# Patient Record
Sex: MALE | Race: WHITE | NOT HISPANIC OR LATINO
[De-identification: names, ages, dates, MRNs, and addresses within clinical notes are randomized per-mention and may not be internally consistent; named-entity substitution may affect disease eponyms.]

---

## 2019-04-24 PROBLEM — Z00.00 ENCOUNTER FOR PREVENTIVE HEALTH EXAMINATION: Status: ACTIVE | Noted: 2019-04-24

## 2019-05-07 ENCOUNTER — APPOINTMENT (OUTPATIENT)
Dept: UROLOGY | Facility: CLINIC | Age: 64
End: 2019-05-07
Payer: COMMERCIAL

## 2019-05-07 VITALS
DIASTOLIC BLOOD PRESSURE: 80 MMHG | BODY MASS INDEX: 22.83 KG/M2 | HEART RATE: 73 BPM | TEMPERATURE: 98.4 F | OXYGEN SATURATION: 99 % | WEIGHT: 176 LBS | HEIGHT: 73.5 IN | SYSTOLIC BLOOD PRESSURE: 144 MMHG

## 2019-05-07 PROCEDURE — 99204 OFFICE O/P NEW MOD 45 MIN: CPT | Mod: 25

## 2019-05-07 PROCEDURE — 51798 US URINE CAPACITY MEASURE: CPT

## 2019-05-07 RX ORDER — SILDENAFIL 20 MG/1
20 TABLET ORAL
Qty: 90 | Refills: 11 | Status: ACTIVE | COMMUNITY
Start: 2019-05-07 | End: 1900-01-01

## 2019-05-07 NOTE — LETTER BODY
[Dear  ___] : Dear  [unfilled], [FreeTextEntry2] : Esdras Christianson MD\par [facility]\par [address]\[place] [address] [FreeTextEntry1] : I had the pleasure of seeing your patient Amando Reyes in the office in consultation today. Please see the attached note for full details.\par \par Thank you very much for allowing me to participate in the care of this patient. If you have any questions please feel free to call me at any time. \par \par \par Sincerely yours,\par \par \par \par Chaka Cosme MD, ADRIÁN\par Director, Male Fertility and Microsurgery\par  of Urology\par [facility]\par \par \par This letter has been dictated using computer software but not reviewed to expedite transmission.\par

## 2019-05-07 NOTE — HISTORY OF PRESENT ILLNESS
[FreeTextEntry1] : 64M generally healthy comes in for prostate evalatuion. using viagra for many years. using viagra 50mg bought [place]. responds well to medcation. underwent vasectomy 25 years ago. reports penile and testicular sensitivity over time. previously was having pain during sexual activity. pain has now resolved. IPSS 6. nocturia x 3. good FOS. occasional frequency. no hematuria. no dysuria. no family history prostate kidney bladder cancer. occasional double voiding. +urgency.

## 2019-05-07 NOTE — PHYSICAL EXAM
[General Appearance - Well Developed] : well developed [General Appearance - Well Nourished] : well nourished [Normal Appearance] : normal appearance [Well Groomed] : well groomed [Heart Rate And Rhythm] : Heart rate and rhythm were normal [] : no respiratory distress [Abdomen Soft] : soft [Abdomen Tenderness] : non-tender [Abdomen Hernia] : no hernia was discovered [Costovertebral Angle Tenderness] : no ~M costovertebral angle tenderness [Urethral Meatus] : meatus normal [Penis Abnormality] : normal circumcised penis [Scrotum] : the scrotum was normal [Urinary Bladder Findings] : the bladder was normal on palpation [Epididymis] : the epididymides were normal [Testes Tenderness] : no tenderness of the testes [Testes Mass (___cm)] : there were no testicular masses [No Prostate Nodules] : no prostate nodules [Prostate Size ___ gm] : prostate size [unfilled] gm [Normal Station and Gait] : the gait and station were normal for the patient's age [Skin Color & Pigmentation] : normal skin color and pigmentation [Oriented To Time, Place, And Person] : oriented to person, place, and time [No Focal Deficits] : no focal deficits [No Palpable Adenopathy] : no palpable adenopathy

## 2019-05-08 LAB
APPEARANCE: CLEAR
BACTERIA: NEGATIVE
BILIRUBIN URINE: NEGATIVE
BLOOD URINE: NEGATIVE
COLOR: YELLOW
GLUCOSE QUALITATIVE U: NEGATIVE
HYALINE CASTS: 0 /LPF
KETONES URINE: NEGATIVE
LEUKOCYTE ESTERASE URINE: NEGATIVE
MICROSCOPIC-UA: NORMAL
NITRITE URINE: NEGATIVE
PH URINE: 5.5
PROTEIN URINE: NEGATIVE
PSA SERPL-MCNC: 0.88 NG/ML
RED BLOOD CELLS URINE: 1 /HPF
SPECIFIC GRAVITY URINE: 1.02
SQUAMOUS EPITHELIAL CELLS: 0 /HPF
UROBILINOGEN URINE: NORMAL
WHITE BLOOD CELLS URINE: 0 /HPF

## 2019-05-09 LAB — BACTERIA UR CULT: NORMAL

## 2019-05-10 ENCOUNTER — TRANSCRIPTION ENCOUNTER (OUTPATIENT)
Age: 64
End: 2019-05-10

## 2019-06-25 ENCOUNTER — APPOINTMENT (OUTPATIENT)
Dept: UROLOGY | Facility: CLINIC | Age: 64
End: 2019-06-25

## 2019-07-16 ENCOUNTER — APPOINTMENT (OUTPATIENT)
Dept: UROLOGY | Facility: CLINIC | Age: 64
End: 2019-07-16
Payer: COMMERCIAL

## 2019-07-16 VITALS — HEART RATE: 97 BPM | SYSTOLIC BLOOD PRESSURE: 127 MMHG | DIASTOLIC BLOOD PRESSURE: 89 MMHG

## 2019-07-16 DIAGNOSIS — R35.1 BENIGN PROSTATIC HYPERPLASIA WITH LOWER URINARY TRACT SYMPMS: ICD-10-CM

## 2019-07-16 DIAGNOSIS — N40.1 BENIGN PROSTATIC HYPERPLASIA WITH LOWER URINARY TRACT SYMPMS: ICD-10-CM

## 2019-07-16 PROCEDURE — 99213 OFFICE O/P EST LOW 20 MIN: CPT

## 2019-07-16 NOTE — HISTORY OF PRESENT ILLNESS
[FreeTextEntry1] : IPSS 16\par no clear improvement\par pirmary symptoms are increased urgency and frequency usually much worse Mondays\par PSA 0.88\par

## 2019-09-24 ENCOUNTER — APPOINTMENT (OUTPATIENT)
Dept: HEART AND VASCULAR | Facility: CLINIC | Age: 64
End: 2019-09-24
Payer: COMMERCIAL

## 2019-09-24 VITALS
DIASTOLIC BLOOD PRESSURE: 80 MMHG | BODY MASS INDEX: 23.35 KG/M2 | HEIGHT: 73.5 IN | SYSTOLIC BLOOD PRESSURE: 140 MMHG | WEIGHT: 179.99 LBS | OXYGEN SATURATION: 97 % | TEMPERATURE: 98.1 F | HEART RATE: 81 BPM

## 2019-09-24 DIAGNOSIS — Z78.9 OTHER SPECIFIED HEALTH STATUS: ICD-10-CM

## 2019-09-24 DIAGNOSIS — E78.00 PURE HYPERCHOLESTEROLEMIA, UNSPECIFIED: ICD-10-CM

## 2019-09-24 DIAGNOSIS — Z82.3 FAMILY HISTORY OF STROKE: ICD-10-CM

## 2019-09-24 DIAGNOSIS — L64.9 ANDROGENIC ALOPECIA, UNSPECIFIED: ICD-10-CM

## 2019-09-24 DIAGNOSIS — Z83.49 FAMILY HISTORY OF OTHER ENDOCRINE, NUTRITIONAL AND METABOLIC DISEASES: ICD-10-CM

## 2019-09-24 DIAGNOSIS — Z82.49 FAMILY HISTORY OF ISCHEMIC HEART DISEASE AND OTHER DISEASES OF THE CIRCULATORY SYSTEM: ICD-10-CM

## 2019-09-24 DIAGNOSIS — R03.0 ELEVATED BLOOD-PRESSURE READING, W/OUT DIAGNOSIS OF HYPERTENSION: ICD-10-CM

## 2019-09-24 DIAGNOSIS — Z87.891 PERSONAL HISTORY OF NICOTINE DEPENDENCE: ICD-10-CM

## 2019-09-24 PROCEDURE — 99203 OFFICE O/P NEW LOW 30 MIN: CPT

## 2019-09-24 PROCEDURE — 36415 COLL VENOUS BLD VENIPUNCTURE: CPT

## 2019-09-24 PROCEDURE — 93000 ELECTROCARDIOGRAM COMPLETE: CPT

## 2019-09-24 RX ORDER — TAMSULOSIN HYDROCHLORIDE 0.4 MG/1
0.4 CAPSULE ORAL
Qty: 30 | Refills: 11 | Status: DISCONTINUED | COMMUNITY
Start: 2019-05-07 | End: 2019-09-24

## 2019-09-24 RX ORDER — FINASTERIDE 1 MG/1
1 TABLET ORAL
Qty: 90 | Refills: 3 | Status: ACTIVE | COMMUNITY
Start: 2019-09-24 | End: 1900-01-01

## 2019-09-24 RX ORDER — OXYBUTYNIN CHLORIDE 10 MG/1
10 TABLET, EXTENDED RELEASE ORAL
Qty: 30 | Refills: 11 | Status: DISCONTINUED | COMMUNITY
Start: 2019-07-16 | End: 2019-09-24

## 2019-09-24 NOTE — PHYSICAL EXAM
[General Appearance - Well Developed] : well developed [Normal Appearance] : normal appearance [Well Groomed] : well groomed [General Appearance - Well Nourished] : well nourished [No Deformities] : no deformities [General Appearance - In No Acute Distress] : no acute distress [Normal Conjunctiva] : the conjunctiva exhibited no abnormalities [Normal Oral Mucosa] : normal oral mucosa [Eyelids - No Xanthelasma] : the eyelids demonstrated no xanthelasmas [No Oral Cyanosis] : no oral cyanosis [No Oral Pallor] : no oral pallor [Heart Sounds] : normal S1 and S2 [Heart Rate And Rhythm] : heart rate and rhythm were normal [Murmurs] : no murmurs present [Exaggerated Use Of Accessory Muscles For Inspiration] : no accessory muscle use [Respiration, Rhythm And Depth] : normal respiratory rhythm and effort [Auscultation Breath Sounds / Voice Sounds] : lungs were clear to auscultation bilaterally [Abdomen Soft] : soft [Abdomen Tenderness] : non-tender [Abnormal Walk] : normal gait [Abdomen Mass (___ Cm)] : no abdominal mass palpated [Gait - Sufficient For Exercise Testing] : the gait was sufficient for exercise testing [Nail Clubbing] : no clubbing of the fingernails [Petechial Hemorrhages (___cm)] : no petechial hemorrhages [Cyanosis, Localized] : no localized cyanosis [Skin Color & Pigmentation] : normal skin color and pigmentation [] : no rash [No Venous Stasis] : no venous stasis [No Skin Ulcers] : no skin ulcer [Skin Lesions] : no skin lesions [No Xanthoma] : no  xanthoma was observed [FreeTextEntry1] : No JVD or bruits

## 2019-09-24 NOTE — REASON FOR VISIT
[FreeTextEntry1] : 63 y/o M, former smoker.  No DM, + HLD PREVIOUSLY ON A STATIN.  Mild HTN.  REMOTE TOBACCO.\par \par EKG: NSR, normal axis and intervals, no ST-Tw abnormalities. 9/24/19\par \par \par Takes RRYE, CoEnQ10, MVI, B complex, Lysine, Fish Oils, Vit D, Probiotic, Peppermint, Colostrum, , Advil

## 2019-09-24 NOTE — HISTORY OF PRESENT ILLNESS
[FreeTextEntry1] : [facility]- Dr Cosme\par PCP-Dr Esdras An \par Jonatan- Sammie Farmer  [address]\par Cardio- formerly Ronny Valdez

## 2019-09-24 NOTE — ASSESSMENT
[FreeTextEntry1] : Mild HTN- MEDS DEFERRED\par \par HLD- Await labs\par \par Mult CRFs- Coronary calcium score ordered.

## 2019-10-10 ENCOUNTER — FORM ENCOUNTER (OUTPATIENT)
Age: 64
End: 2019-10-10

## 2019-10-11 ENCOUNTER — APPOINTMENT (OUTPATIENT)
Dept: CT IMAGING | Facility: HOSPITAL | Age: 64
End: 2019-10-11
Payer: COMMERCIAL

## 2019-10-11 ENCOUNTER — OUTPATIENT (OUTPATIENT)
Dept: OUTPATIENT SERVICES | Facility: HOSPITAL | Age: 64
LOS: 1 days | End: 2019-10-11
Payer: COMMERCIAL

## 2019-10-11 PROCEDURE — 75571 CT HRT W/O DYE W/CA TEST: CPT | Mod: 26

## 2019-10-11 PROCEDURE — 75571 CT HRT W/O DYE W/CA TEST: CPT

## 2019-10-16 RX ORDER — PITAVASTATIN CALCIUM 2.09 MG/1
2 TABLET, FILM COATED ORAL
Qty: 90 | Refills: 0 | Status: ACTIVE | COMMUNITY
Start: 2019-10-16

## 2022-12-27 ENCOUNTER — INPATIENT (INPATIENT)
Facility: HOSPITAL | Age: 67
LOS: 0 days | Discharge: HOME | DRG: 247 | End: 2022-12-28
Attending: HOSPITALIST | Admitting: HOSPITALIST
Payer: COMMERCIAL

## 2022-12-27 ENCOUNTER — TRANSCRIPTION ENCOUNTER (OUTPATIENT)
Age: 67
End: 2022-12-27

## 2022-12-27 VITALS
WEIGHT: 184.09 LBS | SYSTOLIC BLOOD PRESSURE: 117 MMHG | TEMPERATURE: 98 F | HEIGHT: 73 IN | RESPIRATION RATE: 18 BRPM | HEART RATE: 108 BPM | DIASTOLIC BLOOD PRESSURE: 76 MMHG

## 2022-12-27 DIAGNOSIS — I20.0 UNSTABLE ANGINA: ICD-10-CM

## 2022-12-27 DIAGNOSIS — I10 ESSENTIAL (PRIMARY) HYPERTENSION: ICD-10-CM

## 2022-12-27 DIAGNOSIS — E78.5 HYPERLIPIDEMIA, UNSPECIFIED: ICD-10-CM

## 2022-12-27 LAB
ALBUMIN SERPL ELPH-MCNC: 4.6 G/DL — SIGNIFICANT CHANGE UP (ref 3.3–5)
ALP SERPL-CCNC: 75 U/L — SIGNIFICANT CHANGE UP (ref 40–120)
ALT FLD-CCNC: 16 U/L — SIGNIFICANT CHANGE UP (ref 10–45)
ANION GAP SERPL CALC-SCNC: 11 MMOL/L — SIGNIFICANT CHANGE UP (ref 5–17)
AST SERPL-CCNC: 15 U/L — SIGNIFICANT CHANGE UP (ref 10–40)
BASOPHILS # BLD AUTO: 0.03 K/UL — SIGNIFICANT CHANGE UP (ref 0–0.2)
BASOPHILS NFR BLD AUTO: 0.5 % — SIGNIFICANT CHANGE UP (ref 0–2)
BILIRUB SERPL-MCNC: 0.6 MG/DL — SIGNIFICANT CHANGE UP (ref 0.2–1.2)
BLD GP AB SCN SERPL QL: NEGATIVE — SIGNIFICANT CHANGE UP
BUN SERPL-MCNC: 19 MG/DL — SIGNIFICANT CHANGE UP (ref 7–23)
CALCIUM SERPL-MCNC: 10.1 MG/DL — SIGNIFICANT CHANGE UP (ref 8.4–10.5)
CHLORIDE SERPL-SCNC: 101 MMOL/L — SIGNIFICANT CHANGE UP (ref 96–108)
CO2 SERPL-SCNC: 23 MMOL/L — SIGNIFICANT CHANGE UP (ref 22–31)
CREAT SERPL-MCNC: 1.17 MG/DL — SIGNIFICANT CHANGE UP (ref 0.5–1.3)
EGFR: 69 ML/MIN/1.73M2 — SIGNIFICANT CHANGE UP
EOSINOPHIL # BLD AUTO: 0.27 K/UL — SIGNIFICANT CHANGE UP (ref 0–0.5)
EOSINOPHIL NFR BLD AUTO: 4.2 % — SIGNIFICANT CHANGE UP (ref 0–6)
GLUCOSE SERPL-MCNC: 103 MG/DL — HIGH (ref 70–99)
HCT VFR BLD CALC: 46.7 % — SIGNIFICANT CHANGE UP (ref 39–50)
HGB BLD-MCNC: 16.1 G/DL — SIGNIFICANT CHANGE UP (ref 13–17)
IMM GRANULOCYTES NFR BLD AUTO: 0.3 % — SIGNIFICANT CHANGE UP (ref 0–0.9)
LYMPHOCYTES # BLD AUTO: 1.51 K/UL — SIGNIFICANT CHANGE UP (ref 1–3.3)
LYMPHOCYTES # BLD AUTO: 23.5 % — SIGNIFICANT CHANGE UP (ref 13–44)
MCHC RBC-ENTMCNC: 29.1 PG — SIGNIFICANT CHANGE UP (ref 27–34)
MCHC RBC-ENTMCNC: 34.5 GM/DL — SIGNIFICANT CHANGE UP (ref 32–36)
MCV RBC AUTO: 84.3 FL — SIGNIFICANT CHANGE UP (ref 80–100)
MONOCYTES # BLD AUTO: 0.54 K/UL — SIGNIFICANT CHANGE UP (ref 0–0.9)
MONOCYTES NFR BLD AUTO: 8.4 % — SIGNIFICANT CHANGE UP (ref 2–14)
NEUTROPHILS # BLD AUTO: 4.05 K/UL — SIGNIFICANT CHANGE UP (ref 1.8–7.4)
NEUTROPHILS NFR BLD AUTO: 63.1 % — SIGNIFICANT CHANGE UP (ref 43–77)
NRBC # BLD: 0 /100 WBCS — SIGNIFICANT CHANGE UP (ref 0–0)
NT-PROBNP SERPL-SCNC: 19 PG/ML — SIGNIFICANT CHANGE UP (ref 0–300)
PLATELET # BLD AUTO: 286 K/UL — SIGNIFICANT CHANGE UP (ref 150–400)
POTASSIUM SERPL-MCNC: 4.1 MMOL/L — SIGNIFICANT CHANGE UP (ref 3.5–5.3)
POTASSIUM SERPL-SCNC: 4.1 MMOL/L — SIGNIFICANT CHANGE UP (ref 3.5–5.3)
PROT SERPL-MCNC: 8.1 G/DL — SIGNIFICANT CHANGE UP (ref 6–8.3)
RBC # BLD: 5.54 M/UL — SIGNIFICANT CHANGE UP (ref 4.2–5.8)
RBC # FLD: 12.9 % — SIGNIFICANT CHANGE UP (ref 10.3–14.5)
RH IG SCN BLD-IMP: POSITIVE — SIGNIFICANT CHANGE UP
SARS-COV-2 RNA SPEC QL NAA+PROBE: NEGATIVE — SIGNIFICANT CHANGE UP
SODIUM SERPL-SCNC: 135 MMOL/L — SIGNIFICANT CHANGE UP (ref 135–145)
TROPONIN T SERPL-MCNC: <0.01 NG/ML — SIGNIFICANT CHANGE UP (ref 0–0.01)
WBC # BLD: 6.42 K/UL — SIGNIFICANT CHANGE UP (ref 3.8–10.5)
WBC # FLD AUTO: 6.42 K/UL — SIGNIFICANT CHANGE UP (ref 3.8–10.5)

## 2022-12-27 PROCEDURE — 92978 ENDOLUMINL IVUS OCT C 1ST: CPT | Mod: 26,LD

## 2022-12-27 PROCEDURE — 93010 ELECTROCARDIOGRAM REPORT: CPT

## 2022-12-27 PROCEDURE — 93458 L HRT ARTERY/VENTRICLE ANGIO: CPT | Mod: 26,59

## 2022-12-27 PROCEDURE — 93571 IV DOP VEL&/PRESS C FLO 1ST: CPT | Mod: 26,52,LD

## 2022-12-27 PROCEDURE — 71045 X-RAY EXAM CHEST 1 VIEW: CPT | Mod: 26

## 2022-12-27 PROCEDURE — 99284 EMERGENCY DEPT VISIT MOD MDM: CPT | Mod: 25

## 2022-12-27 PROCEDURE — 93572 IV DOP VEL&/PRESS C FLO EA: CPT | Mod: 26,52,LC

## 2022-12-27 PROCEDURE — 99152 MOD SED SAME PHYS/QHP 5/>YRS: CPT

## 2022-12-27 PROCEDURE — 99223 1ST HOSP IP/OBS HIGH 75: CPT

## 2022-12-27 PROCEDURE — 92928 PRQ TCAT PLMT NTRAC ST 1 LES: CPT | Mod: LD

## 2022-12-27 RX ORDER — CLOPIDOGREL BISULFATE 75 MG/1
75 TABLET, FILM COATED ORAL ONCE
Refills: 0 | Status: COMPLETED | OUTPATIENT
Start: 2022-12-27 | End: 2022-12-27

## 2022-12-27 RX ORDER — CHLORHEXIDINE GLUCONATE 213 G/1000ML
1 SOLUTION TOPICAL ONCE
Refills: 0 | Status: DISCONTINUED | OUTPATIENT
Start: 2022-12-27 | End: 2022-12-28

## 2022-12-27 RX ORDER — LOSARTAN POTASSIUM 100 MG/1
25 TABLET, FILM COATED ORAL EVERY 24 HOURS
Refills: 0 | Status: DISCONTINUED | OUTPATIENT
Start: 2022-12-28 | End: 2022-12-28

## 2022-12-27 RX ORDER — METOPROLOL TARTRATE 50 MG
25 TABLET ORAL ONCE
Refills: 0 | Status: COMPLETED | OUTPATIENT
Start: 2022-12-27 | End: 2022-12-27

## 2022-12-27 RX ORDER — ASPIRIN/CALCIUM CARB/MAGNESIUM 324 MG
324 TABLET ORAL ONCE
Refills: 0 | Status: COMPLETED | OUTPATIENT
Start: 2022-12-27 | End: 2022-12-27

## 2022-12-27 RX ORDER — SODIUM CHLORIDE 9 MG/ML
250 INJECTION INTRAMUSCULAR; INTRAVENOUS; SUBCUTANEOUS ONCE
Refills: 0 | Status: COMPLETED | OUTPATIENT
Start: 2022-12-27 | End: 2022-12-27

## 2022-12-27 RX ORDER — ASPIRIN/CALCIUM CARB/MAGNESIUM 324 MG
81 TABLET ORAL DAILY
Refills: 0 | Status: DISCONTINUED | OUTPATIENT
Start: 2022-12-28 | End: 2022-12-28

## 2022-12-27 RX ORDER — CLOPIDOGREL BISULFATE 75 MG/1
75 TABLET, FILM COATED ORAL DAILY
Refills: 0 | Status: DISCONTINUED | OUTPATIENT
Start: 2022-12-28 | End: 2022-12-28

## 2022-12-27 RX ORDER — SODIUM CHLORIDE 9 MG/ML
500 INJECTION INTRAMUSCULAR; INTRAVENOUS; SUBCUTANEOUS
Refills: 0 | Status: DISCONTINUED | OUTPATIENT
Start: 2022-12-27 | End: 2022-12-28

## 2022-12-27 RX ORDER — ISOSORBIDE MONONITRATE 60 MG/1
1 TABLET, EXTENDED RELEASE ORAL
Qty: 0 | Refills: 0 | DISCHARGE

## 2022-12-27 RX ORDER — METOPROLOL TARTRATE 50 MG
25 TABLET ORAL DAILY
Refills: 0 | Status: DISCONTINUED | OUTPATIENT
Start: 2022-12-28 | End: 2022-12-28

## 2022-12-27 RX ADMIN — Medication 25 MILLIGRAM(S): at 13:05

## 2022-12-27 RX ADMIN — Medication 324 MILLIGRAM(S): at 13:05

## 2022-12-27 RX ADMIN — CLOPIDOGREL BISULFATE 75 MILLIGRAM(S): 75 TABLET, FILM COATED ORAL at 13:05

## 2022-12-27 RX ADMIN — SODIUM CHLORIDE 250 MILLILITER(S): 9 INJECTION INTRAMUSCULAR; INTRAVENOUS; SUBCUTANEOUS at 17:15

## 2022-12-27 RX ADMIN — SODIUM CHLORIDE 1000 MILLILITER(S): 9 INJECTION INTRAMUSCULAR; INTRAVENOUS; SUBCUTANEOUS at 14:38

## 2022-12-27 NOTE — ED PROVIDER NOTE - CLINICAL SUMMARY MEDICAL DECISION MAKING FREE TEXT BOX
68 y/o M with HTN, HLD, recently diagnosed with abnormal stress test and echo now sent by cardiologist for emergent catheterization due to ongoing symptoms. Pt looks well, no acute distress, EKG NSR and no ischemic changes. Will check labs, EKG, Troponin, BMP, consult cardiology and likely admit. Do not  suspect dissection or PE given clinical picture.

## 2022-12-27 NOTE — H&P ADULT - ASSESSMENT
66 y/o M, former smoker, with PMHx of HTN, HLD (statin intolerance, severe myalgia), presented to [facility] ED on 12/27 with c/o intermittent chest pain and exertional fatigue for about 2 months, recent abnormal stress echo concerning for ischemia in LAD territory, admitted to cardiac tele for unstable angina with plan for cardiac cath today with Dr. Wallace.

## 2022-12-27 NOTE — H&P ADULT - NSHPLABSRESULTS_GEN_ALL_CORE
LABS:                   16.1   6.42  )-----------( 286      ( 27 Dec 2022 11:12 )             46.7       12-27    135  |  101  |  19  ----------------------------<  103<H>  4.1   |  23  |  1.17    Ca    10.1      27 Dec 2022 11:12    TPro  8.1  /  Alb  4.6  /  TBili  0.6  /  DBili  x   /  AST  15  /  ALT  16  /  AlkPhos  75  12-27          CARDIAC MARKERS ( 27 Dec 2022 11:12 )  x     / <0.01 ng/mL / x     / x     / x            EKG 12/27/22: NSR 87bpm, no acute ischemia LABS:                   16.1   6.42  )-----------( 286      ( 27 Dec 2022 11:12 )             46.7       12-27    135  |  101  |  19  ----------------------------<  103<H>  4.1   |  23  |  1.17    Ca    10.1      27 Dec 2022 11:12    TPro  8.1  /  Alb  4.6  /  TBili  0.6  /  DBili  x   /  AST  15  /  ALT  16  /  AlkPhos  75  12-27          CARDIAC MARKERS ( 27 Dec 2022 11:12 )  x     / <0.01 ng/mL / x     / x     / x        COVID-19 PCR 12/27/22: negative    EKG 12/27/22: NSR 87bpm, no acute ischemia

## 2022-12-27 NOTE — DISCHARGE NOTE PROVIDER - CARE PROVIDER_API CALL
Huang Lenz  CARDIOVASCULAR DISEASE  [address], [address]  [place] [address]  Phone: [phone]  Fax: [phone]  Follow Up Time:

## 2022-12-27 NOTE — H&P ADULT - HISTORY OF PRESENT ILLNESS
INCOMPLETE    68 y/o M with a PMHx of HTN, HLD presenting to the ED with chest pain. Pt recently diagnosed with abnormal stress echo send by cardiologist for catheterization. Pt states over the last 2 months he has been experiencing chest pain and had seen a cardiologist Dr. Huang Lenz who performed echo and stress test which was found to be abnormal and started pt on Asprin and Plavix as well as Metoprolol and Losartan. Pt did not take his medication today with anticipation of coming to the ED. Pt described chest pain as tightness when exerting himself and endorses cough. Pt denies any SOB. 68 y/o M, former smoker, with PMHx of HTN, HLD (statin intolerance, severe myalgia), presented to [facility] ED on 12/27 with c/o intermittent chest pain for about 2 months. CP described as pressure/tightness, occurring occasionally at rest but mostly with exertion. He is very active but has noticed becoming fatigued/winded with activities like making the bed. Denies dizziness, palpitations, orthopnea/PND, leg swelling, LOC, bleeding, melena/hematochezia, fever, chills, URI symptoms, or recent illness. He saw his cardiologist Dr. Lenz who performed stress echo 12/8/22 which was abnormal.     In the ER, patient CP-free, HD stable, afebrile, /76, HR 80s, SpO2 95% on RA. Labs notable for troponin negative x1, CBC/BMP unremarkable. COVID PCR negative. CXR without acute cardiopulmonary abnormality. He received aspirin 324mg and plavix 75mg. Admitted to cardiac tele with unstable angina.

## 2022-12-27 NOTE — ED PROVIDER NOTE - PROGRESS NOTE DETAILS
spoke with Dr. Lenz - will fax report of abn stress echo last week.  rec admit for cath due to concern for unstable angina.

## 2022-12-27 NOTE — H&P ADULT - PROBLEM SELECTOR PLAN 1
-No hx of CAD/stents but recent exercise stress echo 12/8/22 showing narendra/apical/septal wall hypokinesis with exercise and diffuse 2-3mm horizontal ST depressions  -Trop neg x1, EKG NSR 87bpm, no acute ischemia  -telemetry monitoring  -echo 12/8/22: EF 72%, no significant valvular disease  -f/u lipid panel, A1c  -NPO for cath with Dr. Wallace. Consent obtained and placed in chart  -pt was started on aspirin 81mg and plavix 75mg last week and reports compliance; received aspirin 324mg and plavix 75mg in ER  -continue home Toprol XL 25mg daily  -No statin 2/2 severe myalgias with multiple statins. Pt started Praluent last week    -ASA III, Mallampati I  -suitable candidate for moderate sedation  -IVF: NS 250cc bolus  -cath consent obtained    Risks & benefits of procedure and alternative therapy have been explained to the patient including but not limited to: allergic reaction, bleeding w/possible need for blood transfusion, infection, renal and vascular compromise, limb damage, arrhythmia, stroke, vessel dissection/perforation, Myocardial infarction, emergent CABG.

## 2022-12-27 NOTE — DISCHARGE NOTE PROVIDER - NSDCCPCAREPLAN_GEN_ALL_CORE_FT
PRINCIPAL DISCHARGE DIAGNOSIS  Diagnosis: CAD (coronary artery disease)  Assessment and Plan of Treatment: You underwent a cardiac angiogram and received a stent to the proximal Left Anterior Descending artery (pLAD). PLEASE CONTINUE ASPIRIN 81MG DAILY AND PLAVIX 75MG DAILY. DO NOT STOP THESE MEDICATIONS FOR ANY REASON AS THEY ARE KEEPING YOUR STENT OPEN AND PREVENTING A HEART ATTACK. Avoid strenuous activity or heavy lifting for the next five days. Do not take a bath or swim for the next five days; you may shower. For any bleeding or hematoma formation (hardened blood collection under the skin) at the access site of RIGHT WRIST please hold pressure and go to the emergency room. Please follow up with Dr. GARCIA in 1-2 weeks. For recurrent chest pain, please call your doctor or go to the emergency room.      SECONDARY DISCHARGE DIAGNOSES  Diagnosis: Encounter for cardiac rehabilitation  Assessment and Plan of Treatment: We have provided you with a prescription for cardiac rehab which is medically supervised exercise program for your heart and has been shown to improve the quantity and quality of life of people with heart disease like yours. You should attend cardiac rehab 3 times per week for 12 weeks. We have provided you with a list of nearby facilities. Please call your insurance carrier to determine which of these facilities are covered under your plan. Please bring this prescription with you to your follow up appointment with your cardiologist who can then further assist you to enroll into a cardiac rehab program.    Diagnosis: HTN (hypertension)  Assessment and Plan of Treatment: Please continue medications as listed to keep your blood pressure controlled. For blood pressure that is too high or too low please see your doctor or go to the emergency room as necessary.    Diagnosis: HLD (hyperlipidemia)  Assessment and Plan of Treatment: Please continue your Praluent injections routinely to keep your cholesterol low. High cholesterol contributes to heart disease.     PRINCIPAL DISCHARGE DIAGNOSIS  Diagnosis: CAD (coronary artery disease)  Assessment and Plan of Treatment: You underwent a cardiac angiogram and received a stent to the proximal Left Anterior Descending artery (pLAD). PLEASE CONTINUE ASPIRIN 81MG DAILY AND PLAVIX 75MG DAILY. DO NOT STOP THESE MEDICATIONS FOR ANY REASON AS THEY ARE KEEPING YOUR STENT OPEN AND PREVENTING A HEART ATTACK. Avoid strenuous activity or heavy lifting for the next five days. Do not take a bath or swim for the next five days; you may shower. For any bleeding or hematoma formation (hardened blood collection under the skin) at the access site of RIGHT WRIST please hold pressure and go to the emergency room. Please follow up with Dr. GARCIA in 1-2 weeks. For recurrent chest pain, please call your doctor or go to the emergency room.  ALSO, please see your primary care doctor for an alternative for your tadalafil, as that medication can interfere with your heart conditions.      SECONDARY DISCHARGE DIAGNOSES  Diagnosis: HTN (hypertension)  Assessment and Plan of Treatment: Your losartan dose was increased to 50 mg once daily; Please continue medications as listed to keep your blood pressure controlled. For blood pressure that is too high or too low please see your doctor or go to the emergency room as necessary.    Diagnosis: HLD (hyperlipidemia)  Assessment and Plan of Treatment: Please continue your Praluent injections routinely to keep your cholesterol low. High cholesterol contributes to heart disease.    Diagnosis: Encounter for cardiac rehabilitation  Assessment and Plan of Treatment: We have provided you with a prescription for cardiac rehab which is medically supervised exercise program for your heart and has been shown to improve the quantity and quality of life of people with heart disease like yours. You should attend cardiac rehab 3 times per week for 12 weeks. We have provided you with a list of nearby facilities. Please call your insurance carrier to determine which of these facilities are covered under your plan. Please bring this prescription with you to your follow up appointment with your cardiologist who can then further assist you to enroll into a cardiac rehab program.

## 2022-12-27 NOTE — DISCHARGE NOTE PROVIDER - NSDCMRMEDTOKEN_GEN_ALL_CORE_FT
Aspirin Enteric Coated 81 mg oral delayed release tablet: 1 tab(s) orally once a day  clopidogrel 75 mg oral tablet: 1 tab(s) orally once a day  losartan 25 mg oral tablet: 1 tab(s) orally once a day  metoprolol succinate 25 mg oral tablet, extended release: 1 tab(s) orally once a day  Praluent Pen 150 mg/mL subcutaneous solution: 1 dose(s) subcutaneous every 2 weeks  tadalafil 20 mg oral tablet: 1 tab(s) orally once a day   Aspirin Enteric Coated 81 mg oral delayed release tablet: 1 tab(s) orally once a day   Aspirin Enteric Coated 81 mg oral delayed release tablet: 1 tab(s) orally once a day  cardiac rehabilitation: Cardiac rehab 2-3 times week x 12 weeks  Diagnosis - post PCI   Cardiologist - Dr Lenz  clopidogrel 75 mg oral tablet: 1 tab(s) orally once a day  clopidogrel 75 mg oral tablet: 1 tab(s) orally once a day   losartan 50 mg oral tablet: 1 tab(s) orally once a day   losartan 50 mg oral tablet: 1 tab(s) orally once a day   metoprolol succinate 25 mg oral tablet, extended release: 1 tab(s) orally once a day  Praluent Pen 150 mg/mL subcutaneous solution: 1 dose(s) subcutaneous every 2 weeks

## 2022-12-27 NOTE — DISCHARGE NOTE PROVIDER - NSDCCPGOAL_GEN_ALL_CORE_FT
Patient Education     Pharyngitis: Strep (Presumed)    You have pharyngitis (sore throat). The cause is thought to be the streptococcus, or strep, bacterium. Strep throat infection can cause throat pain that is worse when swallowing, aching all over, headache, and fever. The infection may be spread by coughing, kissing, or touching others after touching your mouth or nose. Antibiotic medications are given to treat the infection.  Home care  · Rest at home. Drink plenty of fluids to avoid dehydration.  · No work or school for the first 2 days of taking the antibiotics. After this time, you will not be contagious. You can then return to work or school if you are feeling better.   · The antibiotic medication must be taken for the full 10 days, even if you feel better. This is very important to ensure the infection is treated. It is also important to prevent drug-resistant organisms from developing. If you were given an antibiotic shot, no more antibiotics are needed.  · You may use acetaminophen or ibuprofen to control pain or fever, unless another medicine was prescribed for this. If you have chronic liver or kidney disease or ever had a stomach ulcer or GI bleeding, talk with your doctor before using these medicines.  · Throat lozenges or a throat-numbing sprays can help reduce throat pain. Gargling with warm salt water can also help. Dissolve 1/2 teaspoon of salt in 1 8 ounce glass of warm water.   · Avoid salty or spicy foods, which can irritate the throat.  Follow-up care  Follow up with your healthcare provider or our staff if you are not improving over the next week.  When to seek medical advice  Call your healthcare provider right away if any of these occur:  · Fever as directed by your doctor.   · New or worsening ear pain, sinus pain, or headache  · Painful lumps in the back of neck  · Stiff neck  · Lymph nodes are getting larger  · Inability to swallow liquids, excessive drooling, or inability to open mouth  wide due to throat pain  · Signs of dehydration (very dark urine or no urine, sunken eyes, dizziness)  · Trouble breathing or noisy breathing  · Muffled voice  · New rash  © 8288-8217 [employer] [address] [place] [address], [place] [address]. All rights reserved. This information is not intended as a substitute for professional medical care. Always follow your healthcare professional's instructions.                   Depression / Anxiety :     [facility] in [place] [phone]    Patient Education     Depression  Depression is one of the most common mental health problems today. It is not just a state of unhappiness or sadness. It is a true disease. The cause seems to be related to a decrease in chemicals that transmit signals in the brain. Having a family history of depression, alcoholism, or suicide increases the risk. Chronic illness, chronic pain, migraine headaches and high emotional stress also increase the risk.  Depression is something we tend to recognize in others, but may have a hard time seeing in ourselves. It can show in many physical and emotional ways:  · Loss of appetite  · Over-eating  · Not being able to sleep  · Sleeping too much  · Tiredness not related to physical exertion  · Restlessness or irritability  · Slowness of movement or speech  · Feeling depressed or withdrawn  · Loss of interest in things you once enjoyed  · Trouble concentrating, poor memory, trouble making decisions  · Thoughts of harming or killing oneself, or thoughts that life is not worth living  · Low self-esteem  The treatment for depression may include both medicine and psychotherapy. Antidepressants can reduce suffering and can improve the ability to function during the depressed period. Therapy can offer emotional support and help you understand emotional factors that may be causing the depression.  Home care  · On-going care and support helps people manage this disease.  Find a healthcare  To get better and follow your care plan as instructed. provider and therapist who meet your needs. Seek help when you feel like you may be getting ill.  · Be kind to yourself. Make it a point to do things that you enjoy (gardening, walking in nature, going to a movie, etc.). Reward yourself for small successes.  · Take care of your physical body. Eat a balanced diet (low in saturated fat and high in fruits and vegetables). Exercise at least 3 times a week for 30 minutes. Even mild-moderate exercise (like brisk walking) can make you feel better.  · Take medicine as prescribed.  · Tell each of your healthcare providers about all of the prescription drugs, over-the-counter medicines, vitamins, and supplements you take. Certain supplements interact with medicines and can result in dangerous side effects. Ask your pharmacist when you have questions about drug interactions.  · Talk with your family and trusted friends about your feelings and thoughts. Ask them to help you recognize behavior changes early so you can get help and, if needed, medicine can be adjusted.  Follow-up care  Follow up with your healthcare provider, or as advised.  Call 911  Call 911 if you:  · Have suicidal thoughts, a suicide plan, and the means to carry out the plan  · Have trouble breathing  · Are very confused  · Feel very drowsy or have trouble awakening  · Faint or lose consciousness  · Have new chest pain that becomes more severe, lasts longer, or spreads into your shoulder, arm, neck, jaw or back  When to seek medical advice  Call your healthcare provider right away if any of these occur:  · Feeling extreme depression, fear, anxiety, or anger toward yourself or others  · Feeling out of control  · Feeling that you may try to harm yourself or another  · Hearing voices that others do not hear  · Seeing things that others do not see  · Can’t sleep or eat for 3 days in a row  · Friends or family express concern over your behavior and ask you to seek help  © 8602-3397 [employer] [address]  [place] [address]. All rights reserved. This information is not intended as a substitute for professional medical care. Always follow your healthcare professional's instructions.

## 2022-12-27 NOTE — H&P ADULT - NSHPPHYSICALEXAM_GEN_ALL_CORE
Vital Signs Last 24 Hrs  T(C): 37.2 (27 Dec 2022 12:56), Max: 37.2 (27 Dec 2022 12:56)  T(F): 98.9 (27 Dec 2022 12:56), Max: 98.9 (27 Dec 2022 12:56)  HR: 72 (27 Dec 2022 12:56) (72 - 108)  BP: 143/80 (27 Dec 2022 12:56) (117/76 - 143/80)  BP(mean): --  RR: 18 (27 Dec 2022 12:56) (18 - 18)  SpO2: 95% (27 Dec 2022 12:56) (95% - 95%)    Parameters below as of 27 Dec 2022 12:56  Patient On (Oxygen Delivery Method): room air

## 2022-12-27 NOTE — H&P ADULT - PROBLEM SELECTOR PLAN 3
-f/u lipid profile  -pt reports severe myalgias with multiple statins; started Praluent last week    #DVT PPx- low risk    #Dispo- pending cath    Case d/w Dr. Fleming and Dr. Wallace

## 2022-12-27 NOTE — H&P ADULT - NSICDXPASTMEDICALHX_GEN_ALL_CORE_FT
PAST MEDICAL HISTORY:  HTN (hypertension)     HTN (hypertension)        PAST MEDICAL HISTORY:  HTN (hypertension)     Hyperlipidemia

## 2022-12-27 NOTE — DISCHARGE NOTE PROVIDER - HOSPITAL COURSE
66 y/o M, former smoker, with PMHx of HTN, HLD (statin intolerance, severe myalgia), presented to [facility] ED on 12/27 with c/o intermittent chest pain for about 2 months. CP described as pressure/tightness, occurring occasionally at rest but mostly with exertion. He is very active but has noticed becoming fatigued/winded with activities like making the bed. Denies dizziness, palpitations, orthopnea/PND, leg swelling, LOC, bleeding, melena/hematochezia, fever, chills, URI symptoms, or recent illness. He saw his cardiologist Dr. Lenz who performed stress echo 12/8/22 which was abnormal.     Pt now s/p cardiac catheterization (12/27/22): JOYCE pLAD; LM w/ mild LI, oLCx 70% (iFR negative 0.95), RCA w/ mild LI; LVEDP 15mmHg. ACCESS: R radial w/ TR band for hemostasis.     Pt evaluate prior to discharge. VSS, labs unremarkable, no events on telemetry, and physical exam benign w/ right radial access site stable without hematoma/bleeding. Hospital course reviewed with attending cardiologist and patient deemed stable for discharge. DAPT: ASA/Plavix. STATIN: none, as pt w/ severe myalgias w/ stating (pt on PCSK9 inhibitor). Pt to follow up with cardiologist Dr. Huang Lenz within 1-2 weeks of discharge. Prescription sent to patient's preferred pharmacy and discharge instructions reviewed with patient.      68 y/o M, former smoker, with PMHx of HTN, HLD (statin intolerance, severe myalgia), presented to [facility] ED on 12/27 with c/o intermittent chest pain for about 2 months. CP described as pressure/tightness, occurring occasionally at rest but mostly with exertion. He is very active but has noticed becoming fatigued/winded with activities like making the bed. Denies dizziness, palpitations, orthopnea/PND, leg swelling, LOC, bleeding, melena/hematochezia, fever, chills, URI symptoms, or recent illness. He saw his cardiologist Dr. Lenz who performed stress echo 12/8/22 which was abnormal.     Pt now s/p cardiac catheterization (12/27/22): JOYCE pLAD; LM w/ mild LI, oLCx 70% (iFR negative 0.95), RCA w/ mild LI; LVEDP 15mmHg. ACCESS: R radial w/ TR band for hemostasis.     Pt evaluate prior to discharge. VSS, labs unremarkable, no events on telemetry, and physical exam benign w/ right radial access site stable without hematoma/bleeding. Hospital course reviewed with attending cardiologist and patient deemed stable for discharge. DAPT: ASA/Plavix. STATIN: none, as pt w/ severe myalgias w/ statin (pt on PCSK9 inhibitor). Throughout hospital stay, pt remained hypertensive with systolic BP's ranging between 140s-160s; as such, pt's losartan increased from 25 mg qD to 50 mg qD. Pt to follow up with cardiologist Dr. Huang Lenz within 1-2 weeks of discharge. Prescription sent to patient's preferred pharmacy and discharge instructions reviewed with patient.     Discharge medications: ASA 81mg QD, Plavix 75mg QD, Losartan 50 mg QD, Praluent pen    Cardiac Rehab (Post PCI): Education on benefits of Cardiac Rehab provided to patient: Yes, Referral and Prescription Given for Cardiac Rehab: Yes, Pt given list of locations & instructed to contact their insurance company to review list of participating providers.     66 y/o M, former smoker, with PMHx of HTN, HLD (statin intolerance, severe myalgia), presented to [facility] ED on 12/27 with c/o intermittent chest pain for about 2 months. CP described as pressure/tightness, occurring occasionally at rest but mostly with exertion. He is very active but has noticed becoming fatigued/winded with activities like making the bed. Denies dizziness, palpitations, orthopnea/PND, leg swelling, LOC, bleeding, melena/hematochezia, fever, chills, URI symptoms, or recent illness. He saw his cardiologist Dr. Lenz who performed stress echo 12/8/22 which was abnormal.     Pt now s/p cardiac catheterization (12/27/22): JOYCE pLAD; LM w/ mild LI, oLCx 70% (iFR negative 0.95), RCA w/ mild LI; LVEDP 15mmHg. ACCESS: R radial w/ TR band for hemostasis.     Pt evaluate prior to discharge. VSS, labs unremarkable, no events on telemetry, and physical exam benign w/ right radial access site stable without hematoma/bleeding. Hospital course reviewed with attending cardiologist and patient deemed stable for discharge. DAPT: ASA/Plavix. STATIN: none, as pt w/ severe myalgias w/ statin (pt on PCSK9 inhibitor). Throughout hospital stay, pt remained hypertensive with systolic BP's ranging between 140s-160s; as such, pt's losartan increased from 25 mg qD to 50 mg qD. Pt to follow up with cardiologist Dr. Huang Lenz within 1-2 weeks of discharge. Prescription sent to patient's preferred pharmacy and discharge instructions reviewed with patient.     Discharge medications: ASA 81mg QD, Plavix 75mg QD, Losartan 50 mg QD, Praluent pen, Toprol XL 25 mg    Cardiac Rehab (Post PCI): Education on benefits of Cardiac Rehab provided to patient: Yes, Referral and Prescription Given for Cardiac Rehab: Yes, Pt given list of locations & instructed to contact their insurance company to review list of participating providers.     68 y/o M, former smoker, with PMHx of HTN, HLD (statin intolerance, severe myalgia), presented to [facility] ED on 12/27 with c/o intermittent chest pain for about 2 months. CP described as pressure/tightness, occurring occasionally at rest but mostly with exertion. He is very active but has noticed becoming fatigued/winded with activities like making the bed. Denies dizziness, palpitations, orthopnea/PND, leg swelling, LOC, bleeding, melena/hematochezia, fever, chills, URI symptoms, or recent illness. He saw his cardiologist Dr. Lenz who performed stress echo 12/8/22 which was abnormal.     Pt now s/p cardiac catheterization (12/27/22): JOYCE pLAD; LM w/ mild LI, oLCx 70% (iFR negative 0.95), RCA w/ mild LI; LVEDP 15mmHg. ACCESS: R radial w/ TR band for hemostasis.     Pt evaluate prior to discharge. VSS, labs unremarkable, no events on telemetry, and physical exam benign w/ right radial access site stable without hematoma/bleeding. Hospital course reviewed with attending cardiologist and patient deemed stable for discharge. DAPT: ASA/Plavix. STATIN: none, as pt w/ severe myalgias w/ statin (pt on PCSK9 inhibitor). Throughout hospital stay, pt remained hypertensive with systolic BP's ranging between 140s-160s; as such, pt's losartan increased from 25 mg qD to 50 mg qD. Pt to follow up with cardiologist Dr. Huang Lenz within 1-2 weeks of discharge. Prescriptions sent to both [facility] for an immediate 1 month dose of his medications as well as to patient's preferred pharmacy of [facility] for mail order refills, and discharge instructions reviewed with patient.     Discharge medications: ASA 81mg QD, Plavix 75mg QD, Losartan 50 mg QD, Praluent pen, Toprol XL 25 mg    Cardiac Rehab (Post PCI): Education on benefits of Cardiac Rehab provided to patient: Yes, Referral and Prescription Given for Cardiac Rehab: Yes, Pt given list of locations & instructed to contact their insurance company to review list of participating providers.

## 2022-12-27 NOTE — ED ADULT TRIAGE NOTE - CHIEF COMPLAINT QUOTE
Pt co of "chest pain and shortness of breath today, I feel hypertensive, and I recently had an abnormal echo."

## 2022-12-27 NOTE — ED ADULT NURSE NOTE - OBJECTIVE STATEMENT
Pt aaox4 male presents to the ED for chest pain that started weeks ago but he cannot find relief, feels it is worsening. Pt recently had abnormal ECHO and stress test, was placed on blood thinners and aspirin. Pt also complaining of very high blood pressure (170s systolic) at home even when taking his blood pressure medication. Pt NAD, placed on cardiac monitoring pending further eval.

## 2022-12-27 NOTE — ED PROVIDER NOTE - OBJECTIVE STATEMENT
68 y/o M with a PMHx of HTN, HLD presenting to the ED with chest pain. Pt recently diagnosed with abnormal stress echo send by cardiologist for catheterization. Pt states over the last 2 months he has been experiencing chest pain and had seen a cardiologist Dr. Huang Lenz who performed echo and stress test which was found to be abnormal and started pt on Asprin and Plavix as well as Metoprolol and Losartan. Pt did not take his medication today with anticipation of coming to the ED. Pt described chest pain as tightness when exerting himself and endorses cough. Pt denies any SOB.

## 2022-12-27 NOTE — PATIENT PROFILE ADULT - FUNCTIONAL ASSESSMENT - BASIC MOBILITY 6.
Emergency Department Resident Note    Patient: Misha Beltre Age: 52 year old Sex: male   MRN: [id number] : 1969 Encounter Date: 2021       HISTORY OF PRESENT ILLNESS  This is a 52 year old male with history of A. fib/a flutter on Eliquis, hyperlipidemia, hypertension, NSTEMI, CHF last EF 30 to 35%, history of left atrial appendage thrombus presenting with chest pain.  Patient states chest pain was substernal left-sided that was sharp and felt like squeezing.  Pain initially 8 out of 10 now improved.  Patient denies taking any medications for the pain.  States that his slowly self resolved.  Patient reports associated shortness of breath, denies any nausea or vomiting.  Patient states is similar to chest pain he has experienced in the past.  Patient states he has nitro at home but did not take it this morning.  Patient states his pain is resolved and is not requiring any analgesia.    Reported cath in echo in July at which point they found the atrial thrombus.      REVIEW OF SYSTEMS  Review of Systems   Constitutional: Denies fever, chills.   Skin:  Denies rash, skin changes.    Eye:  Denies vision changes.    ENT:  Denies rhinorrhea, sore throat.    Respiratory:  Denies cough, shortness of breath.    Cardiovascular:  chest pain, denies palpitations, swelling.    Gastrointestinal:  Denies nausea, vomiting, abdominal pain.    Genitourinary:  Denies pain, changes in urination.    Musculoskeletal:  Denies weakness, pain.    Neurologic:  Denies headache, dizziness.          PAST HISTORY         Past Medical History:   Diagnosis Date   • Atrial flutter (CMS/HCC)    • Congestive cardiac failure (CMS/HCC)    • Essential (primary) hypertension    • ETOH abuse    • NSTEMI (non-ST elevated myocardial infarction) (CMS/HCC)    • NSTEMI (non-ST elevated myocardial infarction) (CMS/HCC)    • Obesity (BMI 30-39.9)    • Systolic heart failure (CMS/HCC)    • Thrombsis of left atrial appendage following myocardial  infarction (CMS/HCC)    • Tobacco use     Past Surgical History:   Procedure Laterality Date   • BACK SURGERY     • CARDIAC CATHERIZATION     • ECHO LINDA     • HIP ARTHROPLASTY               Social History     Tobacco Use   • Smoking status: Current Every Day Smoker     Packs/day: 0.00     Types: Pipe, Cigarettes   • Smokeless tobacco: Current User   Vaping Use   • Vaping Use: never used   Substance Use Topics   • Alcohol use: Yes     Alcohol/week: 2.0 standard drinks     Types: 2 Cans of beer per week     Comment: once a week   • Drug use: Never    Family History   Problem Relation Age of Onset   • Patient is unaware of any medical problems Mother    • Patient is unaware of any medical problems Father              Prior to Admission medications    Medication Sig Start Date End Date Taking? Authorizing Provider   atorvastatin (LIPITOR) 40 MG tablet TAKE 1 TABLET BY MOUTH EVERY NIGHT 11/29/21   Thu Summers CNP   sacubitril-valsartan (Entresto) 49-51 MG per tablet Take 1 tablet by mouth 2 times daily. 10/27/21   Xavier Coulter MD   metoPROLOL succinate (TOPROL-XL) 100 MG 24 hr tablet Take 1 tablet by mouth daily. 8/13/21   Albertina Robbins DO   spironolactone (ALDACTONE) 25 MG tablet Take 1 tablet by mouth daily. Take 1 tablet 4 times per week to avoid hyperkalemia (elevated potassium level) 8/13/21   Albertina Robbins DO   digoxin (LANOXIN) 0.125 MG tablet Take 1 tablet by mouth daily. 7/28/21   Khalif Torres CNP   furosemide (LASIX) 40 MG tablet Take 1 tablet by mouth daily. 7/23/21   Thu Summers CNP   apixaBAN (ELIQUIS) 5 MG Tab Take 1 tablet by mouth every 12 hours. 7/22/21   Thu Summers CNP   colchicine (COLCRYS) 0.6 MG tablet Take 0.6 mg by mouth daily as needed (as needed for gout flare).    Outside Provider    No Known Allergies        Additional Information:     PHYSICAL EXAMINATION  ED Triage Vitals [12/13/21 4586]   ED Triage Vitals Group      Temp 97 °F (36.1 °C)      Heart Rate 80      Resp 15       BP (!) 162/82      SpO2 99 %      EtCO2 mmHg       Height       Weight       Weight Scale Used       BMI (Calculated)       IBW/kg (Calculated)      Physical Exam  General: Patient is generally well-appearing, in no acute distress.  Head, eyes, ears, nose, throat: NC/AT, Extraocular muscles intact, moist mucous membranes.  Cardiovascular: Regular rate and rhythm, no murmurs, rubs, or gallops, no extra heart sounds.  Pulmonary: No increased work of breathing, lungs are clear to auscultation bilaterally.  Gastrointestinal: Abdomen is soft, nontender, nondistended, no rebound or guarding.  Genitourinary: No suprapubic tenderness, no CVA tenderness.  Musculoskeletal: No clear bony abnormalities.  Skin: No obvious lesions, no ecchymoses.  Neuro: Patient is spontaneously moving all limbs with no focal neurologic abnormalities.  Psych: Patient converses appropriately, displays appropriate mood.    Labs:   Results for orders placed or performed during the hospital encounter of 12/13/21   Light Blue Top   Result Value Ref Range    Extra Tube Hold for Add Ons    Light Green Top   Result Value Ref Range    Extra Tube Hold for Add Ons    Lavender Top   Result Value Ref Range    Extra Tube Hold for Add Ons    Lavender Top   Result Value Ref Range    Extra Tube Hold for Add Ons    Gold Top   Result Value Ref Range    Extra Tube Hold for Add Ons    Comprehensive Metabolic Panel   Result Value Ref Range    Fasting Status      Sodium 141 135 - 145 mmol/L    Potassium 3.9 3.4 - 5.1 mmol/L    Chloride 110 (H) 98 - 107 mmol/L    Carbon Dioxide 25 21 - 32 mmol/L    Anion Gap 10 10 - 20 mmol/L    Glucose 91 70 - 99 mg/dL    BUN 12 6 - 20 mg/dL    Creatinine 0.92 0.67 - 1.17 mg/dL    Glomerular Filtration Rate >90 >=60    BUN/ Creatinine Ratio 13 7 - 25    Calcium 8.8 8.4 - 10.2 mg/dL    Bilirubin, Total 0.7 0.2 - 1.0 mg/dL    GOT/AST 12 <=37 Units/L    GPT/ALT 21 <64 Units/L    Alkaline Phosphatase 71 45 - 117 Units/L    Albumin 3.1  (L) 3.6 - 5.1 g/dL    Protein, Total 7.8 6.4 - 8.2 g/dL    Globulin 4.7 (H) 2.0 - 4.0 g/dL    A/G Ratio 0.7 (L) 1.0 - 2.4   TROPONIN I, HIGH SENSITIVITY   Result Value Ref Range    Troponin I, High Sensitivity 40 <77 ng/L   NT proBNP   Result Value Ref Range    NT-proBNP 1,394 (H) <=125 pg/mL   CBC with Automated Differential (performable only)   Result Value Ref Range    WBC 5.9 4.2 - 11.0 K/mcL    RBC 4.86 4.50 - 5.90 mil/mcL    HGB 14.2 13.0 - 17.0 g/dL    HCT 45.2 39.0 - 51.0 %    MCV 93.0 78.0 - 100.0 fl    MCH 29.2 26.0 - 34.0 pg    MCHC 31.4 (L) 32.0 - 36.5 g/dL    RDW-CV 14.6 11.0 - 15.0 %    RDW-SD 49.9 39.0 - 50.0 fL     140 - 450 K/mcL    NRBC 0 <=0 /100 WBC    Neutrophil, Percent 69 %    Lymphocytes, Percent 16 %    Mono, Percent 11 %    Eosinophils, Percent 3 %    Basophils, Percent 1 %    Immature Granulocytes 0 %    Absolute Neutrophils 4.1 1.8 - 7.7 K/mcL    Absolute Lymphocytes 1.0 1.0 - 4.0 K/mcL    Absolute Monocytes 0.6 0.3 - 0.9 K/mcL    Absolute Eosinophils  0.2 0.0 - 0.5 K/mcL    Absolute Basophils 0.1 0.0 - 0.3 K/mcL    Absolute Immmature Granulocytes 0.0 0.0 - 0.2 K/mcL   TROPONIN I, HIGH SENSITIVITY   Result Value Ref Range    Troponin I, High Sensitivity 38 <77 ng/L   D Dimer, Quantitative   Result Value Ref Range    D Dimer, Quantitative 0.87 (H) <0.57 mg/L (FEU)   Basic Metabolic Panel   Result Value Ref Range    Fasting Status      Sodium 138 135 - 145 mmol/L    Potassium 3.8 3.4 - 5.1 mmol/L    Chloride 106 98 - 107 mmol/L    Carbon Dioxide 28 21 - 32 mmol/L    Anion Gap 8 (L) 10 - 20 mmol/L    Glucose 94 70 - 99 mg/dL    BUN 10 6 - 20 mg/dL    Creatinine 0.78 0.67 - 1.17 mg/dL    Glomerular Filtration Rate >90 >=60    BUN/ Creatinine Ratio 13 7 - 25    Calcium 8.8 8.4 - 10.2 mg/dL   Basic Metabolic Panel   Result Value Ref Range    Fasting Status      Sodium 138 135 - 145 mmol/L    Potassium 3.7 3.4 - 5.1 mmol/L    Chloride 104 98 - 107 mmol/L    Carbon Dioxide 28 21 - 32  mmol/L    Anion Gap 10 10 - 20 mmol/L    Glucose 97 70 - 99 mg/dL    BUN 10 6 - 20 mg/dL    Creatinine 0.76 0.67 - 1.17 mg/dL    Glomerular Filtration Rate >90 >=60    BUN/ Creatinine Ratio 13 7 - 25    Calcium 9.0 8.4 - 10.2 mg/dL   Digoxin   Result Value Ref Range    Digoxin <0.2 (L) 0.8 - 2.1 ng/mL   Rapid SARS-CoV-2 by PCR    Specimen: Nasal, Mid-turbinate; Swab   Result Value Ref Range    Rapid SARS-COV-2 by PCR Not Detected Not Detected / Detected / Presumptive Positive / Inhibitors present    Isolation Guidelines      Procedural Comment         Imaging:   CTA CHEST PULMONARY EMBOLISM W CONTRAST   Final Result   No evidence of a filling defect in the visualized main pulmonary artery is   major branches.  No CT evidence of pulmonary embolism.       Mildly enlarged mediastinal lymphadenopathy.      Small right and tiny left pleural effusions.      Small nodularity in the posterior right upper lobe measures 8 mm image 50.    Vague groundglass changes are noted bilaterally.      Electronically Signed by: HELEN JACKSON MD    Signed on: 12/13/2021 5:51 PM          XR CHEST PA OR AP 1 VIEW   Final Result   FINDINGS/IMPRESSION:      There are minimal degenerative changes of the thoracic spine.  The heart   size is normal.  There is slight tortuosity of the thoracic aorta.      There is relative accentuation of the upper lobe pulmonary vascularity,   suggestive of the presence of early/slight cardiac decompensation, volume   overload, etc.      There is no evidence of infiltrate or pneumothorax.      Electronically Signed by: MARIN SO MD    Signed on: 12/13/2021 9:27 AM                  MEDICAL DECISION MAKING  Initial evaluation and management  This is a 52 year old male with significant cardiac and dysrhythmia history on Eliquis presenting with chest pain that woke him from sleep.  Concerning for cardiac chest pain and ACS given patient's risk factors.  Less concern for PE given patient on Eliquis and no signs  of lower VTE.    Upon presentation to the ED the patient was placed on cardiac and O2 monitors. Vital signs were reviewed.  Hemodynamically stable and afebrile.  No O2 requirement.    Physical exam revealed: Unremarkable cardiopulmonary exam.  No neuro deficits that would be concerning for dissection and pulses equal in the bilateral upper extremities.    - Labs: Infectious, cardiac  - Fluids: We will hold at this time given history of CHF with reduced ejection fraction  - Meds: N/A  - Antibiotics: No infectious symptoms  - Imaging: Chest x-ray  - Other: ECG  - Consults: Cardiology    ECG Interpretation  As reviewed by resident physician  12/13/21  Rate: 82  Rhythm: Sinus  Axis: WNL  Intervals: , QRS 94, QTc 474  ST/T Changes: No ST elevations or depressions.  Flat T waves throughout the precordium.  No signs of acute ischemia. Sgarbossa, Wellens, deWinter criteria not met.  Previous ECG reviewed: yes, previous from Aug 2021 was a. flutter  Repeat ECG indicated: yes    Ongoing evaluation and management      ED Course as of 12/15/21 0843   Mon Dec 13, 2021   0922 CBC with Automated Differential(!):    WBC 5.9   RBC 4.86   HGB 14.2   HCT 45.2   MCV 93.0   MCH 29.2   MCHC 31.4(!)   RDW-CV 14.6   RDW-SD 49.9      NRBC 0   Neutrophil 69   LYMPH 16   MONO 11   EOSIN 3   BASO 1   Immature Granulocytes 0   Absolute Neutrophil 4.1   Absolute Lymph 1.0   Absolute Mono 0.6   Absolute Eos 0.2   Absolute Baso 0.1   Absolute Immature Granulocytes 0.0  No leukocytosis, no anemia [SC]   1039 Rapid SARS-CoV-2 by PCR:    SARS CoV 2 Qualitative RT PCR Not Detected [SC]      ED Course User Index  [SC] Anupam Sage MD     Troponin negative x2 which is reassuring.  BNP mildly elevated but appears consistent with patient's baseline based on review of previous lab results.    Plan for observation with cardiac telemetry for cardiac and ACS work-up.    Consult for admission  Admitting team: [facility] jeronimo  The admitting physician,  was contacted via phone. The patient's presentation to the ED, clinical course, and plan for admission were discussed. The admitting physician has the following recommendations: cardiology consult. The admitting physician agrees to admission to [facility] with cardiac tele.    Consult: Cardiology  The consulting physician, Dr. Centeno, was contacted via PS. The patient's presentation to the ED and clinical course were discussed. The consulting physician has the following recommendations: none at this time. The consulting physician agrees to see the patient in the ED and follow the patient during the admission.          Procedures    IMPRESSION AND PLAN  ED Diagnosis   1. Chest pain, unspecified type     2. Tobacco use disorder         Disposition: Observation with cardiac tele     I participated in the care of this patient and this note provides additional information regarding their visit. Please refer to attending note for further details regarding history, physical exam, workup, medical decision making, and disposition. This note may have been created using voice dictation technology and may include inadvertent errors.      Anupam Sage MD  Emergency Medicine, PGY2  [employer]:        Anupam Sage MD  Resident  12/15/21 2923     4 = No assist / stand by assistance

## 2022-12-28 ENCOUNTER — TRANSCRIPTION ENCOUNTER (OUTPATIENT)
Age: 67
End: 2022-12-28

## 2022-12-28 VITALS
RESPIRATION RATE: 18 BRPM | HEART RATE: 73 BPM | SYSTOLIC BLOOD PRESSURE: 152 MMHG | OXYGEN SATURATION: 98 % | DIASTOLIC BLOOD PRESSURE: 78 MMHG

## 2022-12-28 LAB
ANION GAP SERPL CALC-SCNC: 12 MMOL/L — SIGNIFICANT CHANGE UP (ref 5–17)
BUN SERPL-MCNC: 16 MG/DL — SIGNIFICANT CHANGE UP (ref 7–23)
CALCIUM SERPL-MCNC: 9.4 MG/DL — SIGNIFICANT CHANGE UP (ref 8.4–10.5)
CHLORIDE SERPL-SCNC: 107 MMOL/L — SIGNIFICANT CHANGE UP (ref 96–108)
CO2 SERPL-SCNC: 22 MMOL/L — SIGNIFICANT CHANGE UP (ref 22–31)
CREAT SERPL-MCNC: 1.13 MG/DL — SIGNIFICANT CHANGE UP (ref 0.5–1.3)
EGFR: 72 ML/MIN/1.73M2 — SIGNIFICANT CHANGE UP
GLUCOSE SERPL-MCNC: 101 MG/DL — HIGH (ref 70–99)
HCT VFR BLD CALC: 43.2 % — SIGNIFICANT CHANGE UP (ref 39–50)
HCV AB S/CO SERPL IA: 98.83 S/CO — HIGH
HCV AB SERPL-IMP: REACTIVE
HGB BLD-MCNC: 14.9 G/DL — SIGNIFICANT CHANGE UP (ref 13–17)
MAGNESIUM SERPL-MCNC: 2 MG/DL — SIGNIFICANT CHANGE UP (ref 1.6–2.6)
MCHC RBC-ENTMCNC: 29 PG — SIGNIFICANT CHANGE UP (ref 27–34)
MCHC RBC-ENTMCNC: 34.5 GM/DL — SIGNIFICANT CHANGE UP (ref 32–36)
MCV RBC AUTO: 84 FL — SIGNIFICANT CHANGE UP (ref 80–100)
NRBC # BLD: 0 /100 WBCS — SIGNIFICANT CHANGE UP (ref 0–0)
PLATELET # BLD AUTO: 276 K/UL — SIGNIFICANT CHANGE UP (ref 150–400)
POTASSIUM SERPL-MCNC: 3.9 MMOL/L — SIGNIFICANT CHANGE UP (ref 3.5–5.3)
POTASSIUM SERPL-SCNC: 3.9 MMOL/L — SIGNIFICANT CHANGE UP (ref 3.5–5.3)
RBC # BLD: 5.14 M/UL — SIGNIFICANT CHANGE UP (ref 4.2–5.8)
RBC # FLD: 12.7 % — SIGNIFICANT CHANGE UP (ref 10.3–14.5)
SODIUM SERPL-SCNC: 141 MMOL/L — SIGNIFICANT CHANGE UP (ref 135–145)
WBC # BLD: 6.4 K/UL — SIGNIFICANT CHANGE UP (ref 3.8–10.5)
WBC # FLD AUTO: 6.4 K/UL — SIGNIFICANT CHANGE UP (ref 3.8–10.5)

## 2022-12-28 PROCEDURE — 83735 ASSAY OF MAGNESIUM: CPT

## 2022-12-28 PROCEDURE — 83036 HEMOGLOBIN GLYCOSYLATED A1C: CPT

## 2022-12-28 PROCEDURE — C1753: CPT

## 2022-12-28 PROCEDURE — 92928 PRQ TCAT PLMT NTRAC ST 1 LES: CPT

## 2022-12-28 PROCEDURE — C1769: CPT

## 2022-12-28 PROCEDURE — C1725: CPT

## 2022-12-28 PROCEDURE — 36415 COLL VENOUS BLD VENIPUNCTURE: CPT

## 2022-12-28 PROCEDURE — 84443 ASSAY THYROID STIM HORMONE: CPT

## 2022-12-28 PROCEDURE — 93458 L HRT ARTERY/VENTRICLE ANGIO: CPT

## 2022-12-28 PROCEDURE — 85347 COAGULATION TIME ACTIVATED: CPT

## 2022-12-28 PROCEDURE — 85027 COMPLETE CBC AUTOMATED: CPT

## 2022-12-28 PROCEDURE — 99285 EMERGENCY DEPT VISIT HI MDM: CPT

## 2022-12-28 PROCEDURE — 83880 ASSAY OF NATRIURETIC PEPTIDE: CPT

## 2022-12-28 PROCEDURE — C1887: CPT

## 2022-12-28 PROCEDURE — 86850 RBC ANTIBODY SCREEN: CPT

## 2022-12-28 PROCEDURE — 86803 HEPATITIS C AB TEST: CPT

## 2022-12-28 PROCEDURE — 84484 ASSAY OF TROPONIN QUANT: CPT

## 2022-12-28 PROCEDURE — 85025 COMPLETE CBC W/AUTO DIFF WBC: CPT

## 2022-12-28 PROCEDURE — 87635 SARS-COV-2 COVID-19 AMP PRB: CPT

## 2022-12-28 PROCEDURE — 86900 BLOOD TYPING SEROLOGIC ABO: CPT

## 2022-12-28 PROCEDURE — 93005 ELECTROCARDIOGRAM TRACING: CPT

## 2022-12-28 PROCEDURE — 93010 ELECTROCARDIOGRAM REPORT: CPT

## 2022-12-28 PROCEDURE — 86901 BLOOD TYPING SEROLOGIC RH(D): CPT

## 2022-12-28 PROCEDURE — 0523T NTRAPX C FFR W/3D FUNCJL MAP: CPT

## 2022-12-28 PROCEDURE — C1874: CPT

## 2022-12-28 PROCEDURE — 80061 LIPID PANEL: CPT

## 2022-12-28 PROCEDURE — 71045 X-RAY EXAM CHEST 1 VIEW: CPT

## 2022-12-28 PROCEDURE — 99239 HOSP IP/OBS DSCHRG MGMT >30: CPT

## 2022-12-28 PROCEDURE — 87521 HEPATITIS C PROBE&RVRS TRNSC: CPT

## 2022-12-28 PROCEDURE — 80048 BASIC METABOLIC PNL TOTAL CA: CPT

## 2022-12-28 PROCEDURE — 80053 COMPREHEN METABOLIC PANEL: CPT

## 2022-12-28 PROCEDURE — C1894: CPT

## 2022-12-28 PROCEDURE — 92978 ENDOLUMINL IVUS OCT C 1ST: CPT

## 2022-12-28 RX ORDER — LOSARTAN POTASSIUM 100 MG/1
1 TABLET, FILM COATED ORAL
Qty: 0 | Refills: 0 | DISCHARGE

## 2022-12-28 RX ORDER — ASPIRIN/CALCIUM CARB/MAGNESIUM 324 MG
1 TABLET ORAL
Qty: 0 | Refills: 0 | DISCHARGE

## 2022-12-28 RX ORDER — CLOPIDOGREL BISULFATE 75 MG/1
1 TABLET, FILM COATED ORAL
Qty: 30 | Refills: 0
Start: 2022-12-28 | End: 2023-01-26

## 2022-12-28 RX ORDER — LOSARTAN POTASSIUM 100 MG/1
1 TABLET, FILM COATED ORAL
Qty: 30 | Refills: 1
Start: 2022-12-28 | End: 2023-02-25

## 2022-12-28 RX ORDER — ASPIRIN/CALCIUM CARB/MAGNESIUM 324 MG
1 TABLET ORAL
Qty: 30 | Refills: 11
Start: 2022-12-28 | End: 2023-12-22

## 2022-12-28 RX ORDER — CLOPIDOGREL BISULFATE 75 MG/1
1 TABLET, FILM COATED ORAL
Qty: 0 | Refills: 0 | DISCHARGE

## 2022-12-28 RX ORDER — LOSARTAN POTASSIUM 100 MG/1
1 TABLET, FILM COATED ORAL
Qty: 30 | Refills: 2
Start: 2022-12-28 | End: 2023-03-27

## 2022-12-28 RX ORDER — LOSARTAN POTASSIUM 100 MG/1
50 TABLET, FILM COATED ORAL DAILY
Refills: 0 | Status: DISCONTINUED | OUTPATIENT
Start: 2022-12-28 | End: 2022-12-28

## 2022-12-28 RX ORDER — TADALAFIL 10 MG/1
1 TABLET, FILM COATED ORAL
Qty: 0 | Refills: 0 | DISCHARGE

## 2022-12-28 RX ORDER — ASPIRIN/CALCIUM CARB/MAGNESIUM 324 MG
1 TABLET ORAL
Qty: 30 | Refills: 0
Start: 2022-12-28 | End: 2023-01-26

## 2022-12-28 RX ORDER — CLOPIDOGREL BISULFATE 75 MG/1
1 TABLET, FILM COATED ORAL
Qty: 30 | Refills: 11
Start: 2022-12-28 | End: 2023-12-22

## 2022-12-28 RX ADMIN — Medication 25 MILLIGRAM(S): at 07:04

## 2022-12-28 RX ADMIN — CLOPIDOGREL BISULFATE 75 MILLIGRAM(S): 75 TABLET, FILM COATED ORAL at 11:09

## 2022-12-28 RX ADMIN — Medication 81 MILLIGRAM(S): at 11:08

## 2022-12-28 RX ADMIN — LOSARTAN POTASSIUM 50 MILLIGRAM(S): 100 TABLET, FILM COATED ORAL at 11:48

## 2022-12-28 NOTE — DISCHARGE NOTE NURSING/CASE MANAGEMENT/SOCIAL WORK - NSDCPEFALRISK_GEN_ALL_CORE
For information on Fall & Injury Prevention, visit: [email or web address] OR  [email or web address] OR  https://www.cdc.gov/steadi/patient.html

## 2022-12-28 NOTE — DISCHARGE NOTE NURSING/CASE MANAGEMENT/SOCIAL WORK - NSPROMEDSBROUGHTTOHOSP_GEN_A_NUR
What Type Of Note Output Would You Prefer (Optional)?: Bullet Format How Severe Is Your Skin Lesion?: moderate Has Your Skin Lesion Been Treated?: not been treated Is This A New Presentation, Or A Follow-Up?: Skin Lesion no

## 2022-12-28 NOTE — DISCHARGE NOTE NURSING/CASE MANAGEMENT/SOCIAL WORK - PATIENT PORTAL LINK FT
You can access the FollowMyHealth Patient Portal offered by [facility] by registering at the following website: [email or web address]. By joining [email or web address] FollowMyHealth portal, you will also be able to view your health information using other applications (apps) compatible with our system.

## 2022-12-29 LAB
HCV RNA FLD QL NAA+PROBE: SIGNIFICANT CHANGE UP
HCV RNA SPEC QL PROBE+SIG AMP: SIGNIFICANT CHANGE UP

## 2023-01-04 LAB
ISTAT ACTK (ACTIVATED CLOTTING TIME KAOLIN): 245 SEC — HIGH (ref 74–137)
ISTAT ACTK (ACTIVATED CLOTTING TIME KAOLIN): 371 SEC — HIGH (ref 74–137)

## 2023-01-10 DIAGNOSIS — Z20.822 CONTACT WITH AND (SUSPECTED) EXPOSURE TO COVID-19: ICD-10-CM

## 2023-01-10 DIAGNOSIS — I25.84 CORONARY ATHEROSCLEROSIS DUE TO CALCIFIED CORONARY LESION: ICD-10-CM

## 2023-01-10 DIAGNOSIS — E78.5 HYPERLIPIDEMIA, UNSPECIFIED: ICD-10-CM

## 2023-01-10 DIAGNOSIS — Z79.82 LONG TERM (CURRENT) USE OF ASPIRIN: ICD-10-CM

## 2023-01-10 DIAGNOSIS — I10 ESSENTIAL (PRIMARY) HYPERTENSION: ICD-10-CM

## 2023-01-10 DIAGNOSIS — I25.110 ATHEROSCLEROTIC HEART DISEASE OF NATIVE CORONARY ARTERY WITH UNSTABLE ANGINA PECTORIS: ICD-10-CM

## 2023-01-10 DIAGNOSIS — Z87.891 PERSONAL HISTORY OF NICOTINE DEPENDENCE: ICD-10-CM

## 2023-04-12 NOTE — ED ADULT TRIAGE NOTE - GLASGOW COMA SCALE: BEST MOTOR RESPONSE, MLM
Airway  Performed by: Christina Ji MD  Authorized by: Christina Ji MD     Final Airway Type:  Supraglottic airway  Final Supraglottic Airway:  IGel  SGA Size*:  4  Attempts*:  1   Patient Identified, Procedure confirmed, Emergency equipment available and Safety protocols followed  Location:  OR  Urgency:  Elective  Difficult Airway: No    Indications for Airway Management:  Anesthesia  Sedation Level:  Deep  Mask Difficulty Assessment:  1 - vent by mask  Performed By:  Anesthesiologist  Anesthesiologist:  Christina Ji MD  Start Time: 4/12/2023 12:17 PM   Eyes taped with HY tape     (M6) obeys commands

## 2023-10-09 PROBLEM — I10 ESSENTIAL (PRIMARY) HYPERTENSION: Chronic | Status: ACTIVE | Noted: 2022-12-27

## 2023-10-09 PROBLEM — E78.5 HYPERLIPIDEMIA, UNSPECIFIED: Chronic | Status: ACTIVE | Noted: 2022-12-27

## 2023-10-10 VITALS
HEIGHT: 73 IN | TEMPERATURE: 97 F | WEIGHT: 164.91 LBS | RESPIRATION RATE: 16 BRPM | OXYGEN SATURATION: 100 % | SYSTOLIC BLOOD PRESSURE: 139 MMHG | DIASTOLIC BLOOD PRESSURE: 79 MMHG | HEART RATE: 80 BPM

## 2023-10-10 RX ORDER — CHLORHEXIDINE GLUCONATE 213 G/1000ML
1 SOLUTION TOPICAL ONCE
Refills: 0 | Status: DISCONTINUED | OUTPATIENT
Start: 2023-10-13 | End: 2023-10-14

## 2023-10-10 NOTE — H&P ADULT - HISTORY OF PRESENT ILLNESS
. INCOMPLETE    Cardiologist: Dr Lenz  Pharmacy:   Escort:    69 y/o M, former smoker, PMHx CAD (s/p JOYCE x1 pLAD 12/2022), HTN, HLD (statin intolerance, severe myalgia) presented to Dr. Lenz c/o _____. Denies _____ chest pain, SOB, JAMESON, palpitations, dizziness, LOC, N/V/D, fever/chills/sick contact, diaphoresis, orthopnea/PND, and leg swelling. Echo 7/17/23: EF 68%; minimal/mild MR. s/p cardiac catheterization (12/27/22): JOYCE pLAD; LM w/ mild LI, oLCx 70% (iFR negative 0.95), RCA w/ mild LI; LVEDP 15mmHg. ACCESS: R radial w/ TR band for hemostasis. In light of patient's risk factors, abnormal ____ results, and CCS class ____ anginal/anginal-equivalent symptoms, patient is referred to [facility] for cardiac catheterization w/ possible intervention if clinically indicated. . INCOMPLETE    Cardiologist: Dr Lenz  Pharmacy:   Escort:    67 y/o M, former smoker, PMHx CAD (s/p JOYCE x1 pLAD 12/2022), HTN, HLD (statin intolerance, severe myalgia) presented to Dr. Lenz c/o _____. Denies _____ chest pain, SOB, JAMESON, palpitations, dizziness, LOC, N/V/D, fever/chills/sick contact, diaphoresis, orthopnea/PND, and leg swelling. Echo 7/17/23: EF 68%; minimal/mild MR. s/p cardiac catheterization (12/27/22): JOYCE pLAD; LM w/ mild LI, oLCx 70% (iFR negative 0.95), RCA w/ mild LI. In light of patient's risk factors, abnormal ____ results, and CCS class ____ anginal/anginal-equivalent symptoms, patient is referred to [facility] for cardiac catheterization w/ possible intervention if clinically indicated. Cardiologist: Dr. Lenz  Pharmacy:   Escort:    No office note sent, no stress test/CCTA done. Pt was called for symptomatology    69 y/o M, former smoker, PMHx CAD (s/p JOYCE x1 pLAD 12/2022 @ [facility]), HTN, HLD (statin intolerance, severe myalgia) who presented to Dr. Lenz c/o intermittent generalized chest pain, fatigue with moderate exertion, mild dyspnea and sternal chest tenderness for the past 5 months.  Denies palpitations, dizziness, LOC, N/V/D, fever/chills/sick contact, diaphoresis, orthopnea/PND, and leg swelling. Pt underwent Echo 7/17/23: EF 68%; minimal/mild MR. In light of patient's risk factors and CCS class III anginal/anginal-equivalent symptoms, patient is referred to [facility] for cardiac catheterization w/ possible intervention if clinically indicated.    Cath history:   s/p cardiac catheterization (12/27/22): JOYCE pLAD; LM w/ mild LI, oLCx 70% (iFR negative 0.95), RCA w/ mild LI. Cardiologist: Dr. Lenz  Pharmacy: [facility] (mail in)   Escort: Wife    69 y/o M, former smoker, PMHx CAD (s/p JOYCE x1 pLAD 12/2022 @ [facility]), HTN, HLD (statin intolerance, severe myalgia) who presented to Dr. Lenz c/o intermittent generalized chest pain, fatigue with moderate exertion, mild dyspnea and sternal chest tenderness for the past 5 months.  Denies palpitations, dizziness, LOC, N/V/D, fever/chills/sick contact, diaphoresis, orthopnea/PND, and leg swelling. Pt underwent Echo 7/17/23: EF 68%; minimal/mild MR. In light of patient's risk factors and CCS class III anginal/anginal-equivalent symptoms, patient is referred to [facility] for cardiac catheterization w/ possible intervention if clinically indicated.    Cath history:   s/p cardiac catheterization (12/27/22): JOYCE pLAD; LM w/ mild LI, oLCx 70% (iFR negative 0.95), RCA w/ mild LI.

## 2023-10-10 NOTE — H&P ADULT - ASSESSMENT
67 y/o M, former smoker, PMHx CAD (s/p JOYCE x1 pLAD 12/2022 @ [facility]), HTN, HLD (statin intolerance, severe myalgia) who now presents to [facility] for recommended cardiac cath w/ possible intervention if clinically indicated, in light of pts risk factors, CCS class III anginal/anginal-equivalent symptoms.    -ASA 3, Mallampati 3  -VSS  -Pt compliant w/ home ASA 81mg and Plavis 75mg daily, last dose this AM. No load indicated. H&h 15.1/44.2. Denies any recent bleeding.   -Pre cath IVF Hydration: NS 250cc bolus then c/w maintenance NS @ 75cc/hr x 2hrs. EF 68%. Euvolemic on exam.   -Pre cath consented  -Suitable for moderate sedation    Risks & benefits of procedure and alternative therapy have been explained to the patient including but not limited to: allergic reaction, bleeding w/possible need for blood transfusion, infection, renal and vascular compromise, limb damage, arrhythmia, stroke, vessel dissection/perforation, Myocardial infarction, emergent CABG. Informed consent obtained and in chart.

## 2023-10-10 NOTE — H&P ADULT - NSHPLABSRESULTS_GEN_ALL_CORE
EKG:                              15.1   5.18  )-----------( 252      ( 13 Oct 2023 12:09 )             44.4       10-13    137  |  105  |  22  ----------------------------<  99  4.2   |  23  |  1.00    Ca    9.8      13 Oct 2023 12:30  Mg     2.2     10-13    TPro  7.7  /  Alb  4.7  /  TBili  0.6  /  DBili  x   /  AST  20  /  ALT  20  /  AlkPhos  79  10-13      PT/INR - ( 13 Oct 2023 12:09 )   PT: 10.7 sec;   INR: 0.94          PTT - ( 13 Oct 2023 12:09 )  PTT:31.2 sec    CARDIAC MARKERS ( 13 Oct 2023 12:30 )  x     / x     / 83 U/L / x     / 1.8 ng/mL        Urinalysis Basic - ( 13 Oct 2023 12:30 )    Color: x / Appearance: x / SG: x / pH: x  Gluc: 99 mg/dL / Ketone: x  / Bili: x / Urobili: x   Blood: x / Protein: x / Nitrite: x   Leuk Esterase: x / RBC: x / WBC x   Sq Epi: x / Non Sq Epi: x / Bacteria: x        EKG: EKG: NSR 64, non ischemic                              15.1   5.18  )-----------( 252      ( 13 Oct 2023 12:09 )             44.4       10-13    137  |  105  |  22  ----------------------------<  99  4.2   |  23  |  1.00    Ca    9.8      13 Oct 2023 12:30  Mg     2.2     10-13    TPro  7.7  /  Alb  4.7  /  TBili  0.6  /  DBili  x   /  AST  20  /  ALT  20  /  AlkPhos  79  10-13      PT/INR - ( 13 Oct 2023 12:09 )   PT: 10.7 sec;   INR: 0.94          PTT - ( 13 Oct 2023 12:09 )  PTT:31.2 sec    CARDIAC MARKERS ( 13 Oct 2023 12:30 )  x     / x     / 83 U/L / x     / 1.8 ng/mL        Urinalysis Basic - ( 13 Oct 2023 12:30 )    Color: x / Appearance: x / SG: x / pH: x  Gluc: 99 mg/dL / Ketone: x  / Bili: x / Urobili: x   Blood: x / Protein: x / Nitrite: x   Leuk Esterase: x / RBC: x / WBC x   Sq Epi: x / Non Sq Epi: x / Bacteria: x        EKG:

## 2023-10-13 ENCOUNTER — INPATIENT (INPATIENT)
Facility: HOSPITAL | Age: 68
LOS: 0 days | Discharge: HOME | DRG: 322 | End: 2023-10-14
Attending: INTERNAL MEDICINE | Admitting: INTERNAL MEDICINE
Payer: COMMERCIAL

## 2023-10-13 ENCOUNTER — TRANSCRIPTION ENCOUNTER (OUTPATIENT)
Age: 68
End: 2023-10-13

## 2023-10-13 LAB
A1C WITH ESTIMATED AVERAGE GLUCOSE RESULT: 5.4 % — SIGNIFICANT CHANGE UP (ref 4–5.6)
ALBUMIN SERPL ELPH-MCNC: 4.7 G/DL — SIGNIFICANT CHANGE UP (ref 3.3–5)
ALP SERPL-CCNC: 79 U/L — SIGNIFICANT CHANGE UP (ref 40–120)
ALT FLD-CCNC: 20 U/L — SIGNIFICANT CHANGE UP (ref 10–45)
ANION GAP SERPL CALC-SCNC: 9 MMOL/L — SIGNIFICANT CHANGE UP (ref 5–17)
APTT BLD: 31.2 SEC — SIGNIFICANT CHANGE UP (ref 24.5–35.6)
AST SERPL-CCNC: 20 U/L — SIGNIFICANT CHANGE UP (ref 10–40)
BASOPHILS # BLD AUTO: 0.05 K/UL — SIGNIFICANT CHANGE UP (ref 0–0.2)
BASOPHILS NFR BLD AUTO: 1 % — SIGNIFICANT CHANGE UP (ref 0–2)
BILIRUB SERPL-MCNC: 0.6 MG/DL — SIGNIFICANT CHANGE UP (ref 0.2–1.2)
BUN SERPL-MCNC: 22 MG/DL — SIGNIFICANT CHANGE UP (ref 7–23)
CALCIUM SERPL-MCNC: 9.8 MG/DL — SIGNIFICANT CHANGE UP (ref 8.4–10.5)
CHLORIDE SERPL-SCNC: 105 MMOL/L — SIGNIFICANT CHANGE UP (ref 96–108)
CHOLEST SERPL-MCNC: 136 MG/DL — SIGNIFICANT CHANGE UP
CK MB CFR SERPL CALC: 1.8 NG/ML — SIGNIFICANT CHANGE UP (ref 0–6.7)
CK SERPL-CCNC: 83 U/L — SIGNIFICANT CHANGE UP (ref 30–200)
CO2 SERPL-SCNC: 23 MMOL/L — SIGNIFICANT CHANGE UP (ref 22–31)
CREAT SERPL-MCNC: 1 MG/DL — SIGNIFICANT CHANGE UP (ref 0.5–1.3)
EGFR: 82 ML/MIN/1.73M2 — SIGNIFICANT CHANGE UP
EOSINOPHIL # BLD AUTO: 0.12 K/UL — SIGNIFICANT CHANGE UP (ref 0–0.5)
EOSINOPHIL NFR BLD AUTO: 2.3 % — SIGNIFICANT CHANGE UP (ref 0–6)
ESTIMATED AVERAGE GLUCOSE: 108 MG/DL — SIGNIFICANT CHANGE UP (ref 68–114)
GLUCOSE SERPL-MCNC: 99 MG/DL — SIGNIFICANT CHANGE UP (ref 70–99)
HCT VFR BLD CALC: 44.4 % — SIGNIFICANT CHANGE UP (ref 39–50)
HDLC SERPL-MCNC: 54 MG/DL — SIGNIFICANT CHANGE UP
HGB BLD-MCNC: 15.1 G/DL — SIGNIFICANT CHANGE UP (ref 13–17)
IMM GRANULOCYTES NFR BLD AUTO: 0.4 % — SIGNIFICANT CHANGE UP (ref 0–0.9)
INR BLD: 0.94 — SIGNIFICANT CHANGE UP (ref 0.85–1.18)
LIPID PNL WITH DIRECT LDL SERPL: 70 MG/DL — SIGNIFICANT CHANGE UP
LYMPHOCYTES # BLD AUTO: 0.99 K/UL — LOW (ref 1–3.3)
LYMPHOCYTES # BLD AUTO: 19.1 % — SIGNIFICANT CHANGE UP (ref 13–44)
MAGNESIUM SERPL-MCNC: 2.2 MG/DL — SIGNIFICANT CHANGE UP (ref 1.6–2.6)
MCHC RBC-ENTMCNC: 28.9 PG — SIGNIFICANT CHANGE UP (ref 27–34)
MCHC RBC-ENTMCNC: 34 GM/DL — SIGNIFICANT CHANGE UP (ref 32–36)
MCV RBC AUTO: 84.9 FL — SIGNIFICANT CHANGE UP (ref 80–100)
MONOCYTES # BLD AUTO: 0.36 K/UL — SIGNIFICANT CHANGE UP (ref 0–0.9)
MONOCYTES NFR BLD AUTO: 6.9 % — SIGNIFICANT CHANGE UP (ref 2–14)
NEUTROPHILS # BLD AUTO: 3.64 K/UL — SIGNIFICANT CHANGE UP (ref 1.8–7.4)
NEUTROPHILS NFR BLD AUTO: 70.3 % — SIGNIFICANT CHANGE UP (ref 43–77)
NON HDL CHOLESTEROL: 82 MG/DL — SIGNIFICANT CHANGE UP
NRBC # BLD: 0 /100 WBCS — SIGNIFICANT CHANGE UP (ref 0–0)
PLATELET # BLD AUTO: 252 K/UL — SIGNIFICANT CHANGE UP (ref 150–400)
POTASSIUM SERPL-MCNC: 4.2 MMOL/L — SIGNIFICANT CHANGE UP (ref 3.5–5.3)
POTASSIUM SERPL-SCNC: 4.2 MMOL/L — SIGNIFICANT CHANGE UP (ref 3.5–5.3)
PROT SERPL-MCNC: 7.7 G/DL — SIGNIFICANT CHANGE UP (ref 6–8.3)
PROTHROM AB SERPL-ACNC: 10.7 SEC — SIGNIFICANT CHANGE UP (ref 9.5–13)
RBC # BLD: 5.23 M/UL — SIGNIFICANT CHANGE UP (ref 4.2–5.8)
RBC # FLD: 13 % — SIGNIFICANT CHANGE UP (ref 10.3–14.5)
SODIUM SERPL-SCNC: 137 MMOL/L — SIGNIFICANT CHANGE UP (ref 135–145)
TRIGL SERPL-MCNC: 61 MG/DL — SIGNIFICANT CHANGE UP
WBC # BLD: 5.18 K/UL — SIGNIFICANT CHANGE UP (ref 3.8–10.5)
WBC # FLD AUTO: 5.18 K/UL — SIGNIFICANT CHANGE UP (ref 3.8–10.5)

## 2023-10-13 PROCEDURE — 93010 ELECTROCARDIOGRAM REPORT: CPT

## 2023-10-13 PROCEDURE — 93458 L HRT ARTERY/VENTRICLE ANGIO: CPT | Mod: 26,59

## 2023-10-13 PROCEDURE — 92928 PRQ TCAT PLMT NTRAC ST 1 LES: CPT | Mod: LM

## 2023-10-13 PROCEDURE — 92978 ENDOLUMINL IVUS OCT C 1ST: CPT | Mod: 26,LM

## 2023-10-13 PROCEDURE — 99152 MOD SED SAME PHYS/QHP 5/>YRS: CPT

## 2023-10-13 RX ORDER — AMLODIPINE BESYLATE 2.5 MG/1
5 TABLET ORAL DAILY
Refills: 0 | Status: DISCONTINUED | OUTPATIENT
Start: 2023-10-13 | End: 2023-10-14

## 2023-10-13 RX ORDER — NITROGLYCERIN 6.5 MG
1 CAPSULE, EXTENDED RELEASE ORAL
Refills: 0 | DISCHARGE

## 2023-10-13 RX ORDER — ALIROCUMAB 75 MG/ML
1 INJECTION, SOLUTION SUBCUTANEOUS
Qty: 0 | Refills: 0 | DISCHARGE

## 2023-10-13 RX ORDER — SODIUM CHLORIDE 9 MG/ML
500 INJECTION INTRAMUSCULAR; INTRAVENOUS; SUBCUTANEOUS
Refills: 0 | Status: DISCONTINUED | OUTPATIENT
Start: 2023-10-13 | End: 2023-10-14

## 2023-10-13 RX ORDER — ASPIRIN/CALCIUM CARB/MAGNESIUM 324 MG
81 TABLET ORAL DAILY
Refills: 0 | Status: DISCONTINUED | OUTPATIENT
Start: 2023-10-14 | End: 2023-10-14

## 2023-10-13 RX ORDER — FINASTERIDE 5 MG/1
1 TABLET, FILM COATED ORAL
Refills: 0 | DISCHARGE

## 2023-10-13 RX ORDER — ACETAMINOPHEN 500 MG
650 TABLET ORAL EVERY 6 HOURS
Refills: 0 | Status: DISCONTINUED | OUTPATIENT
Start: 2023-10-13 | End: 2023-10-14

## 2023-10-13 RX ORDER — AMLODIPINE BESYLATE 2.5 MG/1
1 TABLET ORAL
Refills: 0 | DISCHARGE

## 2023-10-13 RX ORDER — SODIUM CHLORIDE 9 MG/ML
250 INJECTION INTRAMUSCULAR; INTRAVENOUS; SUBCUTANEOUS ONCE
Refills: 0 | Status: COMPLETED | OUTPATIENT
Start: 2023-10-13 | End: 2023-10-13

## 2023-10-13 RX ORDER — LOSARTAN POTASSIUM 100 MG/1
50 TABLET, FILM COATED ORAL DAILY
Refills: 0 | Status: DISCONTINUED | OUTPATIENT
Start: 2023-10-14 | End: 2023-10-14

## 2023-10-13 RX ORDER — CLOPIDOGREL BISULFATE 75 MG/1
75 TABLET, FILM COATED ORAL DAILY
Refills: 0 | Status: DISCONTINUED | OUTPATIENT
Start: 2023-10-14 | End: 2023-10-14

## 2023-10-13 RX ORDER — METOPROLOL TARTRATE 50 MG
1 TABLET ORAL
Qty: 0 | Refills: 0 | DISCHARGE

## 2023-10-13 RX ADMIN — SODIUM CHLORIDE 500 MILLILITER(S): 9 INJECTION INTRAMUSCULAR; INTRAVENOUS; SUBCUTANEOUS at 13:06

## 2023-10-13 RX ADMIN — Medication 650 MILLIGRAM(S): at 19:17

## 2023-10-13 RX ADMIN — SODIUM CHLORIDE 220 MILLILITER(S): 9 INJECTION INTRAMUSCULAR; INTRAVENOUS; SUBCUTANEOUS at 16:07

## 2023-10-13 RX ADMIN — AMLODIPINE BESYLATE 5 MILLIGRAM(S): 2.5 TABLET ORAL at 20:56

## 2023-10-13 RX ADMIN — SODIUM CHLORIDE 75 MILLILITER(S): 9 INJECTION INTRAMUSCULAR; INTRAVENOUS; SUBCUTANEOUS at 13:06

## 2023-10-13 RX ADMIN — Medication 650 MILLIGRAM(S): at 18:17

## 2023-10-13 RX ADMIN — SODIUM CHLORIDE 220 MILLILITER(S): 9 INJECTION INTRAMUSCULAR; INTRAVENOUS; SUBCUTANEOUS at 20:58

## 2023-10-13 NOTE — DISCHARGE NOTE PROVIDER - NSDCFUADDINST_GEN_ALL_CORE_FT
Please continue to follow a heart healthy diet, low in sodium, cholesterol and fats. We recommend a Mediterranean diet:  -Incorporate 2 or more servings per day of vegetables, fruits, and whole grains  -Increase intake of fish and legumes/beans to 2 or more servings per week  -Increase intake of healthy fats, such as olive oil and avocados, and have a handful of nuts/seeds most days  -Reduce red/processed meat consumption to 2 or fewer times per week   - Do NOT drive or operate hazardous machinery for 24 hours. Limit your physical activity for 24-48 hours. Do NOT engage in sports, heavy work or heavy lifting more than 5 lbs for 5 days.   - You MAY shower and wash the area gently with soap and water. BUT no TUB BATHS, HOT TUBS OR SWIMMING FOR 5 DAYS.  Do not keep the area covered. Do not put any lotions, creams, or ointments on the site.  - Your procedure was done through your right wrist. If you observe flank bleeding from the puncture site, it is an emergency. Please put direct pressure on the site and go directly to the ER. Bleeding under the skin may also occur and a small "black and blue" may be expected. If the area appears to be expanding or swelling around the puncture site, apply manual compression and go immediately to the nearest ER. If your arm/hand becomes cool or blue and/or you are unable to move it, this must be treated as an emergency, go directly to the nearest ER. Look for signs of infection in the wrist: fever, red streaking of the arm, obvious pus formation and pain.  -If you have any issues or concerns regarding your access site, you may call [facility] at [phone].

## 2023-10-13 NOTE — DISCHARGE NOTE PROVIDER - NSDCMRMEDTOKEN_GEN_ALL_CORE_FT
amLODIPine 5 mg oral tablet: 1 tab(s) orally once a day  Aspirin Enteric Coated 81 mg oral delayed release tablet: 1 tab(s) orally once a day   clopidogrel 75 mg oral tablet: 1 tab(s) orally once a day   finasteride 1 mg oral tablet: 1 tab(s) orally once a day  losartan 50 mg oral tablet: 1 tab(s) orally once a day   nitroglycerin 0.4 mg sublingual tablet: 1 tab(s) sublingually every 5 minutes as needed for  chest pain   amLODIPine 5 mg oral tablet: 1 tab(s) orally once a day  Aspirin Enteric Coated 81 mg oral delayed release tablet: 1 tab(s) orally once a day  Cardiac rehabilitation.: 3 times a week for 12 weeks. Dx CAD s/p PCI. Outpatient cardiologist Dr Elkin Lenz  clopidogrel 75 mg oral tablet: 1 tab(s) orally once a day  finasteride 1 mg oral tablet: 1 tab(s) orally once a day  losartan 50 mg oral tablet: 1 tab(s) orally once a day   nitroglycerin 0.4 mg sublingual tablet: 1 tab(s) sublingually every 5 minutes as needed for  chest pain

## 2023-10-13 NOTE — DISCHARGE NOTE PROVIDER - NSDCCPCAREPLAN_GEN_ALL_CORE_FT
PRINCIPAL DISCHARGE DIAGNOSIS  Diagnosis: CAD (coronary artery disease)  Assessment and Plan of Treatment: You were found to have blockages in the arteries of your heart, also known as Coronary Artery Disease. You underwent a cardiac catheterization on 10/13/23 and received a stent to the left main artery extending into the left anterior descending artery.  PLEASE CONTINUE ASPIRIN 81MG DAILY AND PLAVIX 75MG DAILY. DO NOT STOP THESE MEDICATIONS FOR ANY REASON AS THEY ARE KEEPING YOUR STENT OPEN AND PREVENTING A HEART ATTACK.   Avoid strenuous activity or heavy lifting anything more than 5lbs for the next five days. Do not take a bath or swim for the next five days; you may shower. For any bleeding or hematoma formation (hardened blood collection under the skin) at the access site of your right wrist please hold pressure and go to the emergency room. Please follow up with Dr. Lenz in 1-2 weeks. For recurrent chest pain, please call your doctor or go to the emergency room.  We have provided you with a prescription for cardiac rehab which is medically supervised exercise program for your heart and has been shown to improve the quantity and quality of life of people with heart disease like yours. You should attend cardiac rehab 3 times per week for 12 weeks. We have provided you with a list of nearby facilities. Please call your insurance carrier to determine which of these facilities are covered under your plan. Please bring this prescription with you to your follow up appointment with your cardiologist who can then further assist you to enroll into a cardiac rehab program.        SECONDARY DISCHARGE DIAGNOSES  Diagnosis: HTN (hypertension)  Assessment and Plan of Treatment: Please continue all medications as listed to keep your blood pressure controlled. For blood pressure that is too high or too low please see your doctor or go to the emergency room as necessary.      Diagnosis: HLD (hyperlipidemia)  Assessment and Plan of Treatment: Please continue ____ as listed to keep your blood pressure controlled. For blood pressure that is too high or too low please see your doctor or go to the emergency room as necessary.       PRINCIPAL DISCHARGE DIAGNOSIS  Diagnosis: CAD (coronary artery disease)  Assessment and Plan of Treatment: You were found to have blockages in the arteries of your heart, also known as Coronary Artery Disease. You underwent a cardiac catheterization on 10/13/23 and received 1 cardiac stent to the left main artery extending into the left anterior descending artery. PLEASE CONTINUE ASPIRIN 81MG DAILY AND PLAVIX 75MG DAILY. DO NOT STOP taking these medications unless directed by your cardiologist as this can be LIFE THREATENING and lead to closing up of the cardiac stent and lead to a heart attack!  The possible side effects of these medications include increased risk of bleeding and easy bruising. However, the benefits of preventing stent closure are greater than this risk, so you are advised to take these medications especially after stent placement.      SECONDARY DISCHARGE DIAGNOSES  Diagnosis: HLD (hyperlipidemia)  Assessment and Plan of Treatment: Please continue Praluent injection to keep your cholesterol controlled.    Diagnosis: Encounter for cardiac rehabilitation  Assessment and Plan of Treatment: It is recommended for you to go to cardiac rehabilitation, which is outpatient physical therapy tailored to improve the heart.  It has been shown to improve the quantity and quality of life of people with heart disease like yours. You should attend cardiac rehab 3 times per week for 12 weeks.  You were provided a prescription a list of nearby facilities. Please call your insurance carrier to determine which of these facilities are covered under your plan.   -Please bring this prescription with you to your follow up appointment with your cardiologist who can then further assist you to enroll into a cardiac rehab program.

## 2023-10-13 NOTE — PATIENT PROFILE ADULT - FALL HARM RISK - HARM RISK INTERVENTIONS

## 2023-10-13 NOTE — DISCHARGE NOTE PROVIDER - NSDCACTIVITY_GEN_ALL_CORE
Showering allowed/Stairs allowed/No heavy lifting/straining Showering allowed/Stairs allowed/Walking - Indoors allowed/No heavy lifting/straining/Walking - Outdoors allowed

## 2023-10-13 NOTE — DISCHARGE NOTE PROVIDER - HOSPITAL COURSE
INCOMPLETE    69 y/o M, former smoker, PMHx CAD (s/p JOYCE x1 pLAD 12/2022 @ [facility]), HTN, HLD (statin intolerance, severe myalgia) who presented to [facility] for recommended cardiac cath w/ possible intervention if clinically indicated, in light of pts risk factors, CCS class III anginal/anginal-equivalent symptoms.    Pt now s/p cardiac cath on 10/13/23 with Dr. Wallace/IC fellow Neri: JOYCE x 1 dLM into pLCx (dLM 80%, pLCx 80%) patent LAD stent, RCA: mild diffuse luminal irregularities, RPDA: free of disease. EDP 6mmHg. Access via right radial artery. Pt admitted overnight for observation and telemetry monitoring. Pt seen and examined at bedside this AM without any complaints or events overnight, VSS, labs and telemetry reviewed and pt stable for discharge as discussed with Dr. De Luna. Pt has received appropriate discharge instructions, including medication regimen, access site management and follow up with  __ in 1-2 weeks.    Discharge medications: ASA 81mg QD, Plavix 75mg QD, ______________, hx statin intolerance, on PCSK9 @ home. Will resume outpatient.     Cardiac Rehab (Post PCI): Education on benefits of Cardiac Rehab provided to patient: Yes, Referral and Prescription Given for Cardiac Rehab: Yes, Pt given list of locations & instructed to contact their insurance company to review list of participating providers.     INCOMPLETE    69 y/o M, former smoker, PMHx CAD (s/p JOYCE x1 pLAD 12/2022 @ [facility]), HTN, HLD (statin intolerance, severe myalgia) who presented to [facility] for recommended cardiac cath w/ possible intervention if clinically indicated, in light of pts risk factors, CCS class III anginal/anginal-equivalent symptoms.    Pt now s/p cardiac cath on 10/13/23 with Dr. Wallace/IC fellow Neri: JOYCE x 1 dLM into pLCx (dLM 80%, pLCx 80%) patent LAD stent, RCA: mild diffuse luminal irregularities, RPDA: free of disease. EDP 6mmHg. Access via right radial artery. Pt admitted overnight for observation and telemetry monitoring. Pt seen and examined at bedside this AM without any complaints or events overnight, VSS, labs and telemetry reviewed and pt stable for discharge as discussed with Dr. De Luna. Pt has received appropriate discharge instructions, including medication regimen, access site management and follow up with Dr. Lenz in 1-2 weeks.    Discharge medications: ASA 81mg QD, Plavix 75mg QD, ______________, hx statin intolerance, on PCSK9 @ home. Will resume outpatient.     Cardiac Rehab (Post PCI): Education on benefits of Cardiac Rehab provided to patient: Yes, Referral and Prescription Given for Cardiac Rehab: Yes, Pt given list of locations & instructed to contact their insurance company to review list of participating providers.     67 y/o M, former smoker, PMHx CAD (s/p JOYCE x1 pLAD 12/2022 @ [facility]), HTN, HLD (statin intolerance, severe myalgia) who presented to cardiologist Dr Lenz c/o intermittent generalized chest pain, fatigue with moderate exertion, mild JAMESON x 5 months. Echo 7/17/23: EF 68%, minimal/mild MR. Pt presented to [facility] for recommended cardiac cath in light of pts risk factors, CCS class III anginal/anginal-equivalent symptoms.    Pt now s/p cardiac cath on 10/13/23 with Dr. Wallace: JOYCE x 1 dLM into pLCx (dLM 80%, pLCx 80%) patent LAD stent, RCA: mild diffuse luminal irregularities, RPDA: free of disease. EDP 6mmHg. Access via right radial artery -site stable w/o hematoma. Pt admitted overnight to cardiac tele for observation and monitoring. Pt seen and examined at bedside on day of discharge without any complaints or events overnight, VSS, labs and telemetry reviewed and pt stable for discharge. Pt has received appropriate discharge instructions, including medication regimen, access site management and follow up with Dr. Lenz in 1-2 weeks.    Pt will c/w home ASA 81mg QD, Plavix 75mg QD, Praluent injection 2/2 hx statin intolerance.     Referred for Cardiac Rehab (CAD Post PCI): Education on benefits of Cardiac Rehab provided to patient. Referral and Prescription Given for Cardiac Rehab. Pt given list of locations & instructed to contact their insurance company to review list of participating providers. Pt instructed to bring Cardiac Rehab prescription with them to Cardiology Follow up appointment for assistance with enrollment.

## 2023-10-13 NOTE — DISCHARGE NOTE PROVIDER - CARE PROVIDER_API CALL
Huang Lenz  Cardiovascular Disease  [address], [address]  [place] [address]  Phone: [phone]  Fax: [phone]  Follow Up Time: 2 weeks

## 2023-10-13 NOTE — DISCHARGE NOTE PROVIDER - INSTRUCTIONS
Please continue to follow a heart healthy diet, low in sodium, cholesterol and fats. We recommend a Mediterranean diet:  -Incorporate 2 or more servings per day of vegetables, fruits, and whole grains  -Increase intake of fish and legumes/beans to 2 or more servings per week  -Increase intake of healthy fats, such as olive oil and avocados, and have a handful of nuts/seeds most days  -Reduce red/processed meat consumption to 2 or fewer times per week

## 2023-10-14 ENCOUNTER — TRANSCRIPTION ENCOUNTER (OUTPATIENT)
Age: 68
End: 2023-10-14

## 2023-10-14 VITALS
OXYGEN SATURATION: 98 % | HEART RATE: 66 BPM | RESPIRATION RATE: 16 BRPM | DIASTOLIC BLOOD PRESSURE: 68 MMHG | SYSTOLIC BLOOD PRESSURE: 136 MMHG | TEMPERATURE: 98 F

## 2023-10-14 LAB
ANION GAP SERPL CALC-SCNC: 10 MMOL/L — SIGNIFICANT CHANGE UP (ref 5–17)
BUN SERPL-MCNC: 12 MG/DL — SIGNIFICANT CHANGE UP (ref 7–23)
CALCIUM SERPL-MCNC: 9.2 MG/DL — SIGNIFICANT CHANGE UP (ref 8.4–10.5)
CHLORIDE SERPL-SCNC: 109 MMOL/L — HIGH (ref 96–108)
CO2 SERPL-SCNC: 22 MMOL/L — SIGNIFICANT CHANGE UP (ref 22–31)
CREAT SERPL-MCNC: 1 MG/DL — SIGNIFICANT CHANGE UP (ref 0.5–1.3)
EGFR: 82 ML/MIN/1.73M2 — SIGNIFICANT CHANGE UP
GLUCOSE SERPL-MCNC: 102 MG/DL — HIGH (ref 70–99)
HCT VFR BLD CALC: 41.3 % — SIGNIFICANT CHANGE UP (ref 39–50)
HGB BLD-MCNC: 13.9 G/DL — SIGNIFICANT CHANGE UP (ref 13–17)
MAGNESIUM SERPL-MCNC: 2.1 MG/DL — SIGNIFICANT CHANGE UP (ref 1.6–2.6)
MCHC RBC-ENTMCNC: 29.3 PG — SIGNIFICANT CHANGE UP (ref 27–34)
MCHC RBC-ENTMCNC: 33.7 GM/DL — SIGNIFICANT CHANGE UP (ref 32–36)
MCV RBC AUTO: 86.9 FL — SIGNIFICANT CHANGE UP (ref 80–100)
NRBC # BLD: 0 /100 WBCS — SIGNIFICANT CHANGE UP (ref 0–0)
PLATELET # BLD AUTO: 231 K/UL — SIGNIFICANT CHANGE UP (ref 150–400)
POTASSIUM SERPL-MCNC: 4.1 MMOL/L — SIGNIFICANT CHANGE UP (ref 3.5–5.3)
POTASSIUM SERPL-SCNC: 4.1 MMOL/L — SIGNIFICANT CHANGE UP (ref 3.5–5.3)
RBC # BLD: 4.75 M/UL — SIGNIFICANT CHANGE UP (ref 4.2–5.8)
RBC # FLD: 13 % — SIGNIFICANT CHANGE UP (ref 10.3–14.5)
SODIUM SERPL-SCNC: 141 MMOL/L — SIGNIFICANT CHANGE UP (ref 135–145)
WBC # BLD: 6.1 K/UL — SIGNIFICANT CHANGE UP (ref 3.8–10.5)
WBC # FLD AUTO: 6.1 K/UL — SIGNIFICANT CHANGE UP (ref 3.8–10.5)

## 2023-10-14 PROCEDURE — 80048 BASIC METABOLIC PNL TOTAL CA: CPT

## 2023-10-14 PROCEDURE — C1753: CPT

## 2023-10-14 PROCEDURE — 85610 PROTHROMBIN TIME: CPT

## 2023-10-14 PROCEDURE — 80053 COMPREHEN METABOLIC PANEL: CPT

## 2023-10-14 PROCEDURE — 85730 THROMBOPLASTIN TIME PARTIAL: CPT

## 2023-10-14 PROCEDURE — C1874: CPT

## 2023-10-14 PROCEDURE — 82550 ASSAY OF CK (CPK): CPT

## 2023-10-14 PROCEDURE — C1894: CPT

## 2023-10-14 PROCEDURE — 83036 HEMOGLOBIN GLYCOSYLATED A1C: CPT

## 2023-10-14 PROCEDURE — 92978 ENDOLUMINL IVUS OCT C 1ST: CPT

## 2023-10-14 PROCEDURE — 83735 ASSAY OF MAGNESIUM: CPT

## 2023-10-14 PROCEDURE — 82553 CREATINE MB FRACTION: CPT

## 2023-10-14 PROCEDURE — 85027 COMPLETE CBC AUTOMATED: CPT

## 2023-10-14 PROCEDURE — C7517: CPT

## 2023-10-14 PROCEDURE — 92928 PRQ TCAT PLMT NTRAC ST 1 LES: CPT

## 2023-10-14 PROCEDURE — 99233 SBSQ HOSP IP/OBS HIGH 50: CPT

## 2023-10-14 PROCEDURE — 80061 LIPID PANEL: CPT

## 2023-10-14 PROCEDURE — 85025 COMPLETE CBC W/AUTO DIFF WBC: CPT

## 2023-10-14 PROCEDURE — 93005 ELECTROCARDIOGRAM TRACING: CPT

## 2023-10-14 PROCEDURE — C1725: CPT

## 2023-10-14 PROCEDURE — 36415 COLL VENOUS BLD VENIPUNCTURE: CPT

## 2023-10-14 PROCEDURE — C1887: CPT

## 2023-10-14 PROCEDURE — 92920 PRQ TRLUML C ANGIOP 1ART&/BR: CPT

## 2023-10-14 PROCEDURE — C1769: CPT

## 2023-10-14 RX ORDER — CLOPIDOGREL BISULFATE 75 MG/1
1 TABLET, FILM COATED ORAL
Qty: 30 | Refills: 11
Start: 2023-10-14 | End: 2024-10-07

## 2023-10-14 RX ORDER — ASPIRIN/CALCIUM CARB/MAGNESIUM 324 MG
1 TABLET ORAL
Qty: 30 | Refills: 11
Start: 2023-10-14 | End: 2024-10-07

## 2023-10-14 RX ADMIN — LOSARTAN POTASSIUM 50 MILLIGRAM(S): 100 TABLET, FILM COATED ORAL at 06:07

## 2023-10-14 RX ADMIN — Medication 81 MILLIGRAM(S): at 11:43

## 2023-10-14 RX ADMIN — CLOPIDOGREL BISULFATE 75 MILLIGRAM(S): 75 TABLET, FILM COATED ORAL at 11:43

## 2023-10-14 NOTE — DISCHARGE NOTE NURSING/CASE MANAGEMENT/SOCIAL WORK - PATIENT PORTAL LINK FT
You can access the FollowMyHealth Patient Portal offered by [facility] by registering at the following website: [email or web address]. By joining [employer]’s FollowMyHealth portal, you will also be able to view your health information using other applications (apps) compatible with our system.

## 2023-10-24 DIAGNOSIS — Z79.82 LONG TERM (CURRENT) USE OF ASPIRIN: ICD-10-CM

## 2023-10-24 DIAGNOSIS — I25.110 ATHEROSCLEROTIC HEART DISEASE OF NATIVE CORONARY ARTERY WITH UNSTABLE ANGINA PECTORIS: ICD-10-CM

## 2023-10-24 DIAGNOSIS — I34.0 NONRHEUMATIC MITRAL (VALVE) INSUFFICIENCY: ICD-10-CM

## 2023-10-24 DIAGNOSIS — Z95.5 PRESENCE OF CORONARY ANGIOPLASTY IMPLANT AND GRAFT: ICD-10-CM

## 2023-10-24 DIAGNOSIS — Z87.891 PERSONAL HISTORY OF NICOTINE DEPENDENCE: ICD-10-CM

## 2023-10-24 DIAGNOSIS — I25.84 CORONARY ATHEROSCLEROSIS DUE TO CALCIFIED CORONARY LESION: ICD-10-CM

## 2023-10-24 DIAGNOSIS — E78.5 HYPERLIPIDEMIA, UNSPECIFIED: ICD-10-CM

## 2023-10-24 DIAGNOSIS — I10 ESSENTIAL (PRIMARY) HYPERTENSION: ICD-10-CM

## 2025-05-20 NOTE — ED PROVIDER NOTE - EMPLOYMENT
Medication: sertraline passed protocol.   Last office visit date: 3/17/25  Next appointment scheduled?: Yes     N/A